# Patient Record
Sex: FEMALE | Race: WHITE | Employment: UNEMPLOYED | URBAN - METROPOLITAN AREA
[De-identification: names, ages, dates, MRNs, and addresses within clinical notes are randomized per-mention and may not be internally consistent; named-entity substitution may affect disease eponyms.]

---

## 2018-12-19 ENCOUNTER — ANESTHESIA EVENT (OUTPATIENT)
Dept: OPERATING ROOM | Age: 43
End: 2018-12-19
Payer: COMMERCIAL

## 2018-12-19 ENCOUNTER — HOSPITAL ENCOUNTER (OUTPATIENT)
Age: 43
Setting detail: OUTPATIENT SURGERY
Discharge: HOME | End: 2018-12-19
Attending: ORTHOPAEDIC SURGERY | Admitting: ORTHOPAEDIC SURGERY
Payer: COMMERCIAL

## 2018-12-19 ENCOUNTER — ANESTHESIA (OUTPATIENT)
Dept: OPERATING ROOM | Age: 43
End: 2018-12-19
Payer: COMMERCIAL

## 2018-12-19 ENCOUNTER — HOSPITAL ENCOUNTER (OUTPATIENT)
Dept: GENERAL RADIOLOGY | Age: 43
Setting detail: OUTPATIENT SURGERY
Discharge: HOME | End: 2018-12-21
Attending: ORTHOPAEDIC SURGERY
Payer: COMMERCIAL

## 2018-12-19 VITALS
SYSTOLIC BLOOD PRESSURE: 139 MMHG | HEART RATE: 80 BPM | DIASTOLIC BLOOD PRESSURE: 90 MMHG | BODY MASS INDEX: 38.09 KG/M2 | HEIGHT: 66 IN | OXYGEN SATURATION: 98 % | RESPIRATION RATE: 13 BRPM | TEMPERATURE: 97.7 F | WEIGHT: 237 LBS

## 2018-12-19 VITALS — TEMPERATURE: 97.5 F | SYSTOLIC BLOOD PRESSURE: 111 MMHG | OXYGEN SATURATION: 100 % | DIASTOLIC BLOOD PRESSURE: 61 MMHG

## 2018-12-19 DIAGNOSIS — R52 PAIN: ICD-10-CM

## 2018-12-19 LAB
HCT VFR BLD CALC: 38 % (ref 37–47)
HEMOGLOBIN: 13.4 G/DL (ref 12–16)
MCH RBC QN AUTO: 30.3 PG (ref 27–31.3)
MCHC RBC AUTO-ENTMCNC: 35.1 % (ref 33–37)
MCV RBC AUTO: 86.3 FL (ref 82–100)
PDW BLD-RTO: 13.4 % (ref 11.5–14.5)
PLATELET # BLD: 193 K/UL (ref 130–400)
RBC # BLD: 4.4 M/UL (ref 4.2–5.4)
WBC # BLD: 5.5 K/UL (ref 4.8–10.8)

## 2018-12-19 PROCEDURE — 7100000001 HC PACU RECOVERY - ADDTL 15 MIN: Performed by: ORTHOPAEDIC SURGERY

## 2018-12-19 PROCEDURE — 2500000003 HC RX 250 WO HCPCS: Performed by: ORTHOPAEDIC SURGERY

## 2018-12-19 PROCEDURE — 2709999900 HC NON-CHARGEABLE SUPPLY: Performed by: ORTHOPAEDIC SURGERY

## 2018-12-19 PROCEDURE — 3600000003 HC SURGERY LEVEL 3 BASE: Performed by: ORTHOPAEDIC SURGERY

## 2018-12-19 PROCEDURE — 6360000002 HC RX W HCPCS: Performed by: NURSE ANESTHETIST, CERTIFIED REGISTERED

## 2018-12-19 PROCEDURE — C1713 ANCHOR/SCREW BN/BN,TIS/BN: HCPCS | Performed by: ORTHOPAEDIC SURGERY

## 2018-12-19 PROCEDURE — 3209999900 FLUORO FOR SURGICAL PROCEDURES

## 2018-12-19 PROCEDURE — 6360000002 HC RX W HCPCS: Performed by: ORTHOPAEDIC SURGERY

## 2018-12-19 PROCEDURE — 2500000003 HC RX 250 WO HCPCS: Performed by: NURSE ANESTHETIST, CERTIFIED REGISTERED

## 2018-12-19 PROCEDURE — 2580000003 HC RX 258: Performed by: ORTHOPAEDIC SURGERY

## 2018-12-19 PROCEDURE — 7100000011 HC PHASE II RECOVERY - ADDTL 15 MIN: Performed by: ORTHOPAEDIC SURGERY

## 2018-12-19 PROCEDURE — 3700000000 HC ANESTHESIA ATTENDED CARE: Performed by: ORTHOPAEDIC SURGERY

## 2018-12-19 PROCEDURE — 7100000010 HC PHASE II RECOVERY - FIRST 15 MIN: Performed by: ORTHOPAEDIC SURGERY

## 2018-12-19 PROCEDURE — 2580000003 HC RX 258: Performed by: ANESTHESIOLOGY

## 2018-12-19 PROCEDURE — 3700000001 HC ADD 15 MINUTES (ANESTHESIA): Performed by: ORTHOPAEDIC SURGERY

## 2018-12-19 PROCEDURE — 7100000000 HC PACU RECOVERY - FIRST 15 MIN: Performed by: ORTHOPAEDIC SURGERY

## 2018-12-19 PROCEDURE — 6370000000 HC RX 637 (ALT 250 FOR IP): Performed by: ANESTHESIOLOGY

## 2018-12-19 PROCEDURE — 85027 COMPLETE CBC AUTOMATED: CPT

## 2018-12-19 PROCEDURE — 3600000013 HC SURGERY LEVEL 3 ADDTL 15MIN: Performed by: ORTHOPAEDIC SURGERY

## 2018-12-19 RX ORDER — LIDOCAINE HYDROCHLORIDE 10 MG/ML
1 INJECTION, SOLUTION EPIDURAL; INFILTRATION; INTRACAUDAL; PERINEURAL
Status: DISCONTINUED | OUTPATIENT
Start: 2018-12-19 | End: 2018-12-19 | Stop reason: HOSPADM

## 2018-12-19 RX ORDER — FENTANYL CITRATE 50 UG/ML
50 INJECTION, SOLUTION INTRAMUSCULAR; INTRAVENOUS EVERY 10 MIN PRN
Status: DISCONTINUED | OUTPATIENT
Start: 2018-12-19 | End: 2018-12-19 | Stop reason: HOSPADM

## 2018-12-19 RX ORDER — FENTANYL CITRATE 50 UG/ML
INJECTION, SOLUTION INTRAMUSCULAR; INTRAVENOUS PRN
Status: DISCONTINUED | OUTPATIENT
Start: 2018-12-19 | End: 2018-12-19 | Stop reason: SDUPTHER

## 2018-12-19 RX ORDER — SODIUM CHLORIDE, SODIUM LACTATE, POTASSIUM CHLORIDE, CALCIUM CHLORIDE 600; 310; 30; 20 MG/100ML; MG/100ML; MG/100ML; MG/100ML
INJECTION, SOLUTION INTRAVENOUS CONTINUOUS
Status: DISCONTINUED | OUTPATIENT
Start: 2018-12-19 | End: 2018-12-19 | Stop reason: HOSPADM

## 2018-12-19 RX ORDER — HYDROCODONE BITARTRATE AND ACETAMINOPHEN 5; 325 MG/1; MG/1
1 TABLET ORAL PRN
Status: COMPLETED | OUTPATIENT
Start: 2018-12-19 | End: 2018-12-19

## 2018-12-19 RX ORDER — HYDROCODONE BITARTRATE AND ACETAMINOPHEN 5; 325 MG/1; MG/1
2 TABLET ORAL PRN
Status: COMPLETED | OUTPATIENT
Start: 2018-12-19 | End: 2018-12-19

## 2018-12-19 RX ORDER — ONDANSETRON 2 MG/ML
4 INJECTION INTRAMUSCULAR; INTRAVENOUS
Status: DISCONTINUED | OUTPATIENT
Start: 2018-12-19 | End: 2018-12-19 | Stop reason: HOSPADM

## 2018-12-19 RX ORDER — CEFAZOLIN SODIUM 2 G/50ML
2 SOLUTION INTRAVENOUS
Status: COMPLETED | OUTPATIENT
Start: 2018-12-19 | End: 2018-12-19

## 2018-12-19 RX ORDER — SODIUM CHLORIDE 0.9 % (FLUSH) 0.9 %
10 SYRINGE (ML) INJECTION PRN
Status: DISCONTINUED | OUTPATIENT
Start: 2018-12-19 | End: 2018-12-19 | Stop reason: HOSPADM

## 2018-12-19 RX ORDER — SODIUM CHLORIDE 0.9 % (FLUSH) 0.9 %
10 SYRINGE (ML) INJECTION EVERY 12 HOURS SCHEDULED
Status: DISCONTINUED | OUTPATIENT
Start: 2018-12-19 | End: 2018-12-19 | Stop reason: HOSPADM

## 2018-12-19 RX ORDER — PROPOFOL 10 MG/ML
INJECTION, EMULSION INTRAVENOUS PRN
Status: DISCONTINUED | OUTPATIENT
Start: 2018-12-19 | End: 2018-12-19 | Stop reason: SDUPTHER

## 2018-12-19 RX ORDER — DEXAMETHASONE SODIUM PHOSPHATE 4 MG/ML
INJECTION, SOLUTION INTRA-ARTICULAR; INTRALESIONAL; INTRAMUSCULAR; INTRAVENOUS; SOFT TISSUE PRN
Status: DISCONTINUED | OUTPATIENT
Start: 2018-12-19 | End: 2018-12-19 | Stop reason: SDUPTHER

## 2018-12-19 RX ORDER — MAGNESIUM HYDROXIDE 1200 MG/15ML
LIQUID ORAL CONTINUOUS PRN
Status: DISCONTINUED | OUTPATIENT
Start: 2018-12-19 | End: 2018-12-19 | Stop reason: HOSPADM

## 2018-12-19 RX ORDER — BUPIVACAINE HYDROCHLORIDE 5 MG/ML
INJECTION, SOLUTION EPIDURAL; INTRACAUDAL PRN
Status: DISCONTINUED | OUTPATIENT
Start: 2018-12-19 | End: 2018-12-19 | Stop reason: HOSPADM

## 2018-12-19 RX ORDER — MEPERIDINE HYDROCHLORIDE 25 MG/ML
12.5 INJECTION INTRAMUSCULAR; INTRAVENOUS; SUBCUTANEOUS EVERY 5 MIN PRN
Status: DISCONTINUED | OUTPATIENT
Start: 2018-12-19 | End: 2018-12-19 | Stop reason: HOSPADM

## 2018-12-19 RX ORDER — MIDAZOLAM HYDROCHLORIDE 1 MG/ML
INJECTION INTRAMUSCULAR; INTRAVENOUS PRN
Status: DISCONTINUED | OUTPATIENT
Start: 2018-12-19 | End: 2018-12-19 | Stop reason: SDUPTHER

## 2018-12-19 RX ORDER — LABETALOL HYDROCHLORIDE 5 MG/ML
INJECTION, SOLUTION INTRAVENOUS PRN
Status: DISCONTINUED | OUTPATIENT
Start: 2018-12-19 | End: 2018-12-19 | Stop reason: SDUPTHER

## 2018-12-19 RX ORDER — ONDANSETRON 2 MG/ML
INJECTION INTRAMUSCULAR; INTRAVENOUS PRN
Status: DISCONTINUED | OUTPATIENT
Start: 2018-12-19 | End: 2018-12-19 | Stop reason: SDUPTHER

## 2018-12-19 RX ORDER — LIDOCAINE HYDROCHLORIDE 20 MG/ML
INJECTION, SOLUTION INFILTRATION; PERINEURAL PRN
Status: DISCONTINUED | OUTPATIENT
Start: 2018-12-19 | End: 2018-12-19 | Stop reason: SDUPTHER

## 2018-12-19 RX ORDER — LIDOCAINE HYDROCHLORIDE 10 MG/ML
5 INJECTION, SOLUTION INFILTRATION; PERINEURAL ONCE
Status: COMPLETED | OUTPATIENT
Start: 2018-12-19 | End: 2018-12-19

## 2018-12-19 RX ORDER — METOCLOPRAMIDE HYDROCHLORIDE 5 MG/ML
10 INJECTION INTRAMUSCULAR; INTRAVENOUS
Status: DISCONTINUED | OUTPATIENT
Start: 2018-12-19 | End: 2018-12-19 | Stop reason: HOSPADM

## 2018-12-19 RX ORDER — PROPOFOL 10 MG/ML
INJECTION, EMULSION INTRAVENOUS CONTINUOUS PRN
Status: DISCONTINUED | OUTPATIENT
Start: 2018-12-19 | End: 2018-12-19 | Stop reason: SDUPTHER

## 2018-12-19 RX ORDER — DIPHENHYDRAMINE HYDROCHLORIDE 50 MG/ML
12.5 INJECTION INTRAMUSCULAR; INTRAVENOUS
Status: DISCONTINUED | OUTPATIENT
Start: 2018-12-19 | End: 2018-12-19 | Stop reason: HOSPADM

## 2018-12-19 RX ADMIN — FENTANYL CITRATE 25 MCG: 50 INJECTION, SOLUTION INTRAMUSCULAR; INTRAVENOUS at 14:07

## 2018-12-19 RX ADMIN — FENTANYL CITRATE 50 MCG: 50 INJECTION, SOLUTION INTRAMUSCULAR; INTRAVENOUS at 14:05

## 2018-12-19 RX ADMIN — FENTANYL CITRATE 50 MCG: 50 INJECTION, SOLUTION INTRAMUSCULAR; INTRAVENOUS at 14:27

## 2018-12-19 RX ADMIN — DEXAMETHASONE SODIUM PHOSPHATE 4 MG: 4 INJECTION, SOLUTION INTRA-ARTICULAR; INTRALESIONAL; INTRAMUSCULAR; INTRAVENOUS; SOFT TISSUE at 14:09

## 2018-12-19 RX ADMIN — MIDAZOLAM HYDROCHLORIDE 2 MG: 1 INJECTION, SOLUTION INTRAMUSCULAR; INTRAVENOUS at 13:58

## 2018-12-19 RX ADMIN — FENTANYL CITRATE 50 MCG: 50 INJECTION, SOLUTION INTRAMUSCULAR; INTRAVENOUS at 15:26

## 2018-12-19 RX ADMIN — LIDOCAINE HYDROCHLORIDE 5 ML: 20 INJECTION, SOLUTION INFILTRATION; PERINEURAL at 14:05

## 2018-12-19 RX ADMIN — ONDANSETRON 4 MG: 2 INJECTION INTRAMUSCULAR; INTRAVENOUS at 14:45

## 2018-12-19 RX ADMIN — HYDROCODONE BITARTRATE AND ACETAMINOPHEN 2 TABLET: 5; 325 TABLET ORAL at 17:10

## 2018-12-19 RX ADMIN — FENTANYL CITRATE 50 MCG: 50 INJECTION, SOLUTION INTRAMUSCULAR; INTRAVENOUS at 15:02

## 2018-12-19 RX ADMIN — LIDOCAINE HYDROCHLORIDE 0.1 ML: 10 INJECTION, SOLUTION EPIDURAL; INFILTRATION; INTRACAUDAL; PERINEURAL at 10:37

## 2018-12-19 RX ADMIN — CEFAZOLIN SODIUM 2 G: 2 SOLUTION INTRAVENOUS at 13:58

## 2018-12-19 RX ADMIN — SODIUM CHLORIDE, POTASSIUM CHLORIDE, SODIUM LACTATE AND CALCIUM CHLORIDE: 600; 310; 30; 20 INJECTION, SOLUTION INTRAVENOUS at 13:58

## 2018-12-19 RX ADMIN — FENTANYL CITRATE 25 MCG: 50 INJECTION, SOLUTION INTRAMUSCULAR; INTRAVENOUS at 14:48

## 2018-12-19 RX ADMIN — PROPOFOL 200 MG: 10 INJECTION, EMULSION INTRAVENOUS at 14:05

## 2018-12-19 RX ADMIN — SODIUM CHLORIDE, POTASSIUM CHLORIDE, SODIUM LACTATE AND CALCIUM CHLORIDE 1000 ML: 600; 310; 30; 20 INJECTION, SOLUTION INTRAVENOUS at 10:38

## 2018-12-19 RX ADMIN — PROPOFOL 50 MCG/KG/MIN: 10 INJECTION, EMULSION INTRAVENOUS at 14:49

## 2018-12-19 RX ADMIN — FENTANYL CITRATE 50 MCG: 50 INJECTION, SOLUTION INTRAMUSCULAR; INTRAVENOUS at 14:32

## 2018-12-19 RX ADMIN — LABETALOL HYDROCHLORIDE 10 MG: 5 INJECTION, SOLUTION INTRAVENOUS at 15:08

## 2018-12-19 ASSESSMENT — PULMONARY FUNCTION TESTS
PIF_VALUE: 1
PIF_VALUE: 4
PIF_VALUE: 3
PIF_VALUE: 3
PIF_VALUE: 4
PIF_VALUE: 5
PIF_VALUE: 3
PIF_VALUE: 4
PIF_VALUE: 3
PIF_VALUE: 2
PIF_VALUE: 4
PIF_VALUE: 3
PIF_VALUE: 4
PIF_VALUE: 4
PIF_VALUE: 3
PIF_VALUE: 4
PIF_VALUE: 3
PIF_VALUE: 3
PIF_VALUE: 4
PIF_VALUE: 0
PIF_VALUE: 3
PIF_VALUE: 4
PIF_VALUE: 3
PIF_VALUE: 4
PIF_VALUE: 3
PIF_VALUE: 4
PIF_VALUE: 4
PIF_VALUE: 3
PIF_VALUE: 4
PIF_VALUE: 3
PIF_VALUE: 3
PIF_VALUE: 0
PIF_VALUE: 2
PIF_VALUE: 3
PIF_VALUE: 4
PIF_VALUE: 3
PIF_VALUE: 3
PIF_VALUE: 2
PIF_VALUE: 3
PIF_VALUE: 4
PIF_VALUE: 3
PIF_VALUE: 4
PIF_VALUE: 3
PIF_VALUE: 4
PIF_VALUE: 4
PIF_VALUE: 3
PIF_VALUE: 4
PIF_VALUE: 4
PIF_VALUE: 5
PIF_VALUE: 2
PIF_VALUE: 3
PIF_VALUE: 3
PIF_VALUE: 4
PIF_VALUE: 3
PIF_VALUE: 0
PIF_VALUE: 4
PIF_VALUE: 1
PIF_VALUE: 4
PIF_VALUE: 3
PIF_VALUE: 3
PIF_VALUE: 4
PIF_VALUE: 3
PIF_VALUE: 5
PIF_VALUE: 3
PIF_VALUE: 4
PIF_VALUE: 3
PIF_VALUE: 0
PIF_VALUE: 3
PIF_VALUE: 0
PIF_VALUE: 2
PIF_VALUE: 4
PIF_VALUE: 4
PIF_VALUE: 3
PIF_VALUE: 3
PIF_VALUE: 4
PIF_VALUE: 3
PIF_VALUE: 3
PIF_VALUE: 4
PIF_VALUE: 2
PIF_VALUE: 3
PIF_VALUE: 4
PIF_VALUE: 4
PIF_VALUE: 3
PIF_VALUE: 4
PIF_VALUE: 0
PIF_VALUE: 1
PIF_VALUE: 4
PIF_VALUE: 3
PIF_VALUE: 3
PIF_VALUE: 20
PIF_VALUE: 4
PIF_VALUE: 4
PIF_VALUE: 3
PIF_VALUE: 0
PIF_VALUE: 3
PIF_VALUE: 3
PIF_VALUE: 4
PIF_VALUE: 3
PIF_VALUE: 4
PIF_VALUE: 3
PIF_VALUE: 1
PIF_VALUE: 1
PIF_VALUE: 3
PIF_VALUE: 4
PIF_VALUE: 3
PIF_VALUE: 5
PIF_VALUE: 3
PIF_VALUE: 3
PIF_VALUE: 0
PIF_VALUE: 0
PIF_VALUE: 3
PIF_VALUE: 4

## 2018-12-19 NOTE — OP NOTE
After prepping and draping an incision was marked out over the palmar aspect of the right ring finger extending to the mid lateral line about the radial aspect of the digit with oblique limbs palmarly both proximally and distally. A tourniquet was raised. The 15 blade was used to incise skin. Dissection was carried down to the level of the neurovascular bundle a full-thickness subcutaneous flap was then elevated directly off of the flexor retinacular sheath. The radial neurovascular bundle was protected as the constraining soft tissues adjacent to the neurovascular bundle and the flexor retinacular sheath were released. We then elevated the flexor retinacular sheath between the A2 and the A4 pulley. The pulley was reflected with the subcutaneous flap. The flexor tendons were mobilized. The volar plate was released off of the P2 base. The radial and ulnar collaterals were released off of the P1 distally. The digit was then placed into a shotgun position to allow full exposure of the P2 base. The P2 base was already healing in a suboptimal position. There was destruction of approximately 70% of the total joint surface area. The rongeur and small sagittal saw was used to remove the bone and to contour it as to receive a bola-hamate allograft. Having prepared the P2 base to receive the graft we created a transverse incision over the fourth and fifth CMC articulation. Dissection was carried down to the extensor tendons. These were mobilized and retracted. Care was taken to avoid injury to dorsal sensory branch of ulnar nerve. Subperiosteal dissection was undertaken about the hamate and fourth and fifth CMC articulation. The midline of the graft intended to be harvested was marked out. Having previously measured our recipient site the donor site was marked out with the 15 blade.   Standard techniques were then used to harvest the bola-hamate allograft using the sagittal saws for the dorsal cuts and the

## 2023-06-07 ENCOUNTER — OFFICE VISIT (OUTPATIENT)
Dept: FAMILY MEDICINE CLINIC | Age: 48
End: 2023-06-07
Payer: COMMERCIAL

## 2023-06-07 VITALS
HEIGHT: 66 IN | WEIGHT: 237 LBS | TEMPERATURE: 98.2 F | SYSTOLIC BLOOD PRESSURE: 132 MMHG | HEART RATE: 78 BPM | DIASTOLIC BLOOD PRESSURE: 84 MMHG | BODY MASS INDEX: 38.09 KG/M2 | OXYGEN SATURATION: 97 % | RESPIRATION RATE: 14 BRPM

## 2023-06-07 DIAGNOSIS — T36.95XA ANTIBIOTIC-INDUCED YEAST INFECTION: ICD-10-CM

## 2023-06-07 DIAGNOSIS — H65.192 ACUTE NON-SUPPURATIVE OTITIS MEDIA, LEFT: Primary | ICD-10-CM

## 2023-06-07 DIAGNOSIS — B37.9 ANTIBIOTIC-INDUCED YEAST INFECTION: ICD-10-CM

## 2023-06-07 PROCEDURE — 1036F TOBACCO NON-USER: CPT | Performed by: NURSE PRACTITIONER

## 2023-06-07 PROCEDURE — 99213 OFFICE O/P EST LOW 20 MIN: CPT | Performed by: NURSE PRACTITIONER

## 2023-06-07 PROCEDURE — G8417 CALC BMI ABV UP PARAM F/U: HCPCS | Performed by: NURSE PRACTITIONER

## 2023-06-07 PROCEDURE — G8427 DOCREV CUR MEDS BY ELIG CLIN: HCPCS | Performed by: NURSE PRACTITIONER

## 2023-06-07 RX ORDER — FLUCONAZOLE 150 MG/1
TABLET ORAL
Qty: 2 TABLET | Refills: 0 | Status: SHIPPED | OUTPATIENT
Start: 2023-06-07 | End: 2023-06-07

## 2023-06-07 RX ORDER — AMOXICILLIN AND CLAVULANATE POTASSIUM 875; 125 MG/1; MG/1
1 TABLET, FILM COATED ORAL 2 TIMES DAILY
Qty: 20 TABLET | Refills: 0 | Status: SHIPPED | OUTPATIENT
Start: 2023-06-07 | End: 2023-06-17

## 2023-06-07 RX ORDER — AMOXICILLIN AND CLAVULANATE POTASSIUM 875; 125 MG/1; MG/1
1 TABLET, FILM COATED ORAL 2 TIMES DAILY
Qty: 20 TABLET | Refills: 0 | Status: SHIPPED | OUTPATIENT
Start: 2023-06-07 | End: 2023-06-07

## 2023-06-07 RX ORDER — FLUCONAZOLE 150 MG/1
TABLET ORAL
Qty: 2 TABLET | Refills: 0 | Status: SHIPPED | OUTPATIENT
Start: 2023-06-07

## 2023-06-07 SDOH — ECONOMIC STABILITY: FOOD INSECURITY: WITHIN THE PAST 12 MONTHS, YOU WORRIED THAT YOUR FOOD WOULD RUN OUT BEFORE YOU GOT MONEY TO BUY MORE.: NEVER TRUE

## 2023-06-07 SDOH — ECONOMIC STABILITY: HOUSING INSECURITY
IN THE LAST 12 MONTHS, WAS THERE A TIME WHEN YOU DID NOT HAVE A STEADY PLACE TO SLEEP OR SLEPT IN A SHELTER (INCLUDING NOW)?: NO

## 2023-06-07 SDOH — ECONOMIC STABILITY: INCOME INSECURITY: HOW HARD IS IT FOR YOU TO PAY FOR THE VERY BASICS LIKE FOOD, HOUSING, MEDICAL CARE, AND HEATING?: NOT HARD AT ALL

## 2023-06-07 SDOH — ECONOMIC STABILITY: FOOD INSECURITY: WITHIN THE PAST 12 MONTHS, THE FOOD YOU BOUGHT JUST DIDN'T LAST AND YOU DIDN'T HAVE MONEY TO GET MORE.: NEVER TRUE

## 2023-06-07 ASSESSMENT — PATIENT HEALTH QUESTIONNAIRE - PHQ9
SUM OF ALL RESPONSES TO PHQ9 QUESTIONS 1 & 2: 0
1. LITTLE INTEREST OR PLEASURE IN DOING THINGS: 0
2. FEELING DOWN, DEPRESSED OR HOPELESS: 0
SUM OF ALL RESPONSES TO PHQ QUESTIONS 1-9: 0

## 2023-06-07 ASSESSMENT — ENCOUNTER SYMPTOMS
EYE REDNESS: 0
SINUS PAIN: 0
APNEA: 0
SHORTNESS OF BREATH: 0
ABDOMINAL DISTENTION: 0
CHEST TIGHTNESS: 0
RHINORRHEA: 1
SORE THROAT: 0
EYE ITCHING: 0
WHEEZING: 0
COUGH: 0
CONSTIPATION: 0
ABDOMINAL PAIN: 0
VOMITING: 0
DIARRHEA: 0

## 2023-06-07 NOTE — PROGRESS NOTES
probiotic during antibiotic use and for a few days after to help reduce the risk of developing a secondary infection. Separate the yogurt and antibiotic by at least 1 hour. Avoid alcohol while taking antibiotics. Return if symptoms worsen or fail to improve. Reviewed with the patient: current clinical status, medications, activities and diet. Side effects, adverse effects of the medication prescribed today, as well as treatment plan and result expectations have been discussed with the patient who expresses understanding and desires to proceed. Close follow up to evaluate treatment results and for coordination of care. I have reviewed the patient's medical history in detail and updated the computerized patient record.       Brandon Weiss, APRN - CNP

## 2023-07-10 ENCOUNTER — OFFICE VISIT (OUTPATIENT)
Dept: PRIMARY CARE | Facility: CLINIC | Age: 48
End: 2023-07-10
Payer: COMMERCIAL

## 2023-07-10 VITALS
SYSTOLIC BLOOD PRESSURE: 134 MMHG | TEMPERATURE: 97.8 F | DIASTOLIC BLOOD PRESSURE: 80 MMHG | BODY MASS INDEX: 37.61 KG/M2 | HEIGHT: 66 IN | HEART RATE: 80 BPM | RESPIRATION RATE: 16 BRPM | WEIGHT: 234 LBS

## 2023-07-10 DIAGNOSIS — L08.9 SKIN INFECTION: Primary | ICD-10-CM

## 2023-07-10 PROBLEM — R21 RASH: Status: ACTIVE | Noted: 2023-07-10

## 2023-07-10 PROBLEM — I10 ESSENTIAL HYPERTENSION: Status: ACTIVE | Noted: 2023-07-10

## 2023-07-10 PROBLEM — H10.10 ALLERGIC CONJUNCTIVITIS: Status: ACTIVE | Noted: 2023-07-10

## 2023-07-10 PROBLEM — S62.638D: Status: ACTIVE | Noted: 2023-07-10

## 2023-07-10 PROBLEM — I25.2 OLD INFERIOR WALL MYOCARDIAL INFARCTION: Status: ACTIVE | Noted: 2023-07-10

## 2023-07-10 PROBLEM — J30.2 SEASONAL ALLERGIC RHINITIS: Status: ACTIVE | Noted: 2023-07-10

## 2023-07-10 PROCEDURE — 99213 OFFICE O/P EST LOW 20 MIN: CPT | Performed by: FAMILY MEDICINE

## 2023-07-10 PROCEDURE — 3079F DIAST BP 80-89 MM HG: CPT | Performed by: FAMILY MEDICINE

## 2023-07-10 PROCEDURE — 1036F TOBACCO NON-USER: CPT | Performed by: FAMILY MEDICINE

## 2023-07-10 PROCEDURE — 3075F SYST BP GE 130 - 139MM HG: CPT | Performed by: FAMILY MEDICINE

## 2023-07-10 RX ORDER — LOSARTAN POTASSIUM AND HYDROCHLOROTHIAZIDE 12.5; 5 MG/1; MG/1
1 TABLET ORAL DAILY
COMMUNITY
Start: 2021-04-06 | End: 2023-08-15 | Stop reason: ALTCHOICE

## 2023-07-10 RX ORDER — FLUTICASONE PROPIONATE 50 MCG
1 SPRAY, SUSPENSION (ML) NASAL DAILY
COMMUNITY
Start: 2022-09-26 | End: 2024-02-20 | Stop reason: SDUPTHER

## 2023-07-10 RX ORDER — MONTELUKAST SODIUM 10 MG/1
1 TABLET ORAL DAILY
COMMUNITY
Start: 2022-10-28 | End: 2023-10-30

## 2023-07-10 RX ORDER — PREDNISONE 20 MG/1
20 TABLET ORAL 2 TIMES DAILY
COMMUNITY
End: 2024-01-31 | Stop reason: ALTCHOICE

## 2023-07-10 RX ORDER — OLOPATADINE HYDROCHLORIDE 1 MG/ML
SOLUTION/ DROPS OPHTHALMIC 2 TIMES DAILY
COMMUNITY
Start: 2022-10-06

## 2023-07-10 RX ORDER — CEPHALEXIN 500 MG/1
500 CAPSULE ORAL 4 TIMES DAILY
COMMUNITY
End: 2023-08-15 | Stop reason: ALTCHOICE

## 2023-07-10 RX ORDER — LEVOCETIRIZINE DIHYDROCHLORIDE 5 MG/1
1 TABLET, FILM COATED ORAL DAILY
COMMUNITY
Start: 2022-10-06 | End: 2023-10-30

## 2023-07-10 NOTE — PROGRESS NOTES
Ashia Martinez is a 47 y.o. female here today for blisters all over arms, began June 26. Blisters are leaving scars,itch, and burn.  Already was on 3 antibiotics.     HPI     Getting blisters and crusting of multiple lesions of arms.  Went to  14 days ago and told it was probable staph infection - given Bactrim and it did clear but now recurred since she finished Bactrim.  Arms only and one lesion on leg.  Went to ER yesterday and given Keflex and prednisone and given mupirocin cream too.  She did not start this medicine yet because she wanted to get my opinion.  She has no past medical history of recurrent skin problems.  She says there are no lesions anywhere else on her body.  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      Current Outpatient Medications:     cephalexin (Keflex) 500 mg capsule, Take 1 capsule (500 mg) by mouth 4 times a day., Disp: , Rfl:     fluticasone (Flonase) 50 mcg/actuation nasal spray, Administer 1 spray into affected nostril(s) once daily., Disp: , Rfl:     levocetirizine (Xyzal) 5 mg tablet, Take 1 tablet (5 mg) by mouth once daily., Disp: , Rfl:     losartan-hydrochlorothiazide (Hyzaar) 50-12.5 mg tablet, Take 1 tablet by mouth once daily., Disp: , Rfl:     montelukast (Singulair) 10 mg tablet, Take 1 tablet (10 mg) by mouth once daily., Disp: , Rfl:     olopatadine (Patanol) 0.1 % ophthalmic solution, Administer into affected eye(s) twice a day., Disp: , Rfl:     predniSONE (Deltasone) 20 mg tablet, Take 1 tablet (20 mg) by mouth 2 times a day. For 5 days, Disp: , Rfl:     Patient Active Problem List   Diagnosis    Allergic conjunctivitis    Closed displaced fracture of distal phalanx of ring finger with routine healing    Essential hypertension    Rash    Old inferior wall myocardial infarction    Seasonal allergic rhinitis    Skin infection         No results found for this or any previous visit (from the past 2016 hour(s)).  "    Objective    Visit Vitals  /80   Pulse 80   Temp 36.6 °C (97.8 °F)   Resp 16   Ht 1.676 m (5' 6\")   Wt 106 kg (234 lb)   BMI 37.77 kg/m²     Body mass index is 37.77 kg/m².     Physical Exam   Arms-over the patient's arms are about 8 different small slightly crusted lesions.  Many of them are denuded now and are from 3 to 5 mm in size.  There is no significant induration or tenderness.  No current blisters or pustules.  There are no lesions seen on her trunk.    Assessment    1. Skin infection     I think these lesions likely represent some type of superficial skin infection which has recurred.  There are no pustules or other liquid that can be cultured.  I agree with the cephalexin and mupirocin that was prescribed from the emergency room.  I think it is reasonable also to take the prednisone that was prescribed.  I recommend that she practice very good hand hygiene.  She should keep these lesions covered when she is around other people but she can leave them open to air at night.  She should completely avoid scratching or picking at them.  I think this will heal up and clear as she takes the antibiotic.  I recommend to call us and make a follow up appointment if sxs worsen or do not resolve.           "

## 2023-07-12 ENCOUNTER — TELEPHONE (OUTPATIENT)
Dept: PRIMARY CARE | Facility: CLINIC | Age: 48
End: 2023-07-12
Payer: COMMERCIAL

## 2023-07-12 NOTE — TELEPHONE ENCOUNTER
Pt is currently on Cephalexin 500 mg 4 times a day but she states she has nausea and dizziness, if there are any alternatives?   
Pt was notified.   
English

## 2023-08-15 ENCOUNTER — OFFICE VISIT (OUTPATIENT)
Dept: PRIMARY CARE | Facility: CLINIC | Age: 48
End: 2023-08-15
Payer: COMMERCIAL

## 2023-08-15 VITALS
TEMPERATURE: 97.1 F | HEIGHT: 66 IN | WEIGHT: 233 LBS | BODY MASS INDEX: 37.45 KG/M2 | HEART RATE: 74 BPM | SYSTOLIC BLOOD PRESSURE: 112 MMHG | RESPIRATION RATE: 20 BRPM | DIASTOLIC BLOOD PRESSURE: 76 MMHG

## 2023-08-15 DIAGNOSIS — E66.01 CLASS 2 SEVERE OBESITY DUE TO EXCESS CALORIES WITH SERIOUS COMORBIDITY AND BODY MASS INDEX (BMI) OF 37.0 TO 37.9 IN ADULT (MULTI): ICD-10-CM

## 2023-08-15 DIAGNOSIS — S01.511D LIP LACERATION, SUBSEQUENT ENCOUNTER: ICD-10-CM

## 2023-08-15 DIAGNOSIS — I10 ESSENTIAL HYPERTENSION: ICD-10-CM

## 2023-08-15 DIAGNOSIS — R55 VASOVAGAL SYNCOPE: ICD-10-CM

## 2023-08-15 DIAGNOSIS — E87.6 HYPOKALEMIA: Primary | ICD-10-CM

## 2023-08-15 PROBLEM — E66.812 CLASS 2 SEVERE OBESITY DUE TO EXCESS CALORIES WITH SERIOUS COMORBIDITY AND BODY MASS INDEX (BMI) OF 37.0 TO 37.9 IN ADULT: Status: ACTIVE | Noted: 2023-08-10

## 2023-08-15 PROBLEM — S01.511A LIP LACERATION: Status: ACTIVE | Noted: 2023-08-15

## 2023-08-15 PROCEDURE — 3008F BODY MASS INDEX DOCD: CPT | Performed by: FAMILY MEDICINE

## 2023-08-15 PROCEDURE — 3078F DIAST BP <80 MM HG: CPT | Performed by: FAMILY MEDICINE

## 2023-08-15 PROCEDURE — 1036F TOBACCO NON-USER: CPT | Performed by: FAMILY MEDICINE

## 2023-08-15 PROCEDURE — 99214 OFFICE O/P EST MOD 30 MIN: CPT | Performed by: FAMILY MEDICINE

## 2023-08-15 PROCEDURE — 3074F SYST BP LT 130 MM HG: CPT | Performed by: FAMILY MEDICINE

## 2023-08-15 RX ORDER — LOSARTAN POTASSIUM 50 MG/1
50 TABLET ORAL DAILY
Qty: 90 TABLET | Refills: 1 | Status: SHIPPED | OUTPATIENT
Start: 2023-08-15 | End: 2024-02-09

## 2023-08-15 RX ORDER — AMOXICILLIN 500 MG/1
CAPSULE ORAL
COMMUNITY
End: 2024-02-20 | Stop reason: WASHOUT

## 2023-08-15 NOTE — PROGRESS NOTES
Ashia Martinez is a 47 y.o. female here today for   Chief Complaint   Patient presents with    Hospital Follow-up   /[facility] from 8/10 for Syncope      HPI     Passed out after skin biopsy - felt dizzy and passed out walking out of exam room.  Hit lip and caused laceration.  2 sutures were placed to the left lower bottom lip 5 days ago and she was told to have the sutures removed 5 to 7 days after placed.  She says she is still having a little bit of bleeding from the laceration at times especially if she talks or smiles.  Otherwise it appears to be healing well.  She also chipped her tooth and has an appointment to see her dentist this weekend.    On review she does have a history of syncope in the past especially after blood drawls or seeing blood.  She has no history of heart arrhythmia or seizure disorder or orthostatic symptoms.    K was 2.9 in ER.  She was given oral potassium but no prescription for potassium at home.  On review I noticed that she has had no labs for 3 years - I have ordered multiple times.  She says that she hates blood draws and she passed out after her last blood draw 3 years ago so she has not gotten the labs when I have ordered them.  She does take losartan/HCTZ for high blood pressure.    She says her left ear hearing has been muffled for a few weeks and her ear has been slightly painful.  This was present prior to her fall.  She is currently on amoxicillin and prednisone from an urgent care for this.      Current Outpatient Medications:     amoxicillin (Amoxil) 500 mg capsule, Take by mouth., Disp: , Rfl:     fluticasone (Flonase) 50 mcg/actuation nasal spray, Administer 1 spray into affected nostril(s) once daily., Disp: , Rfl:     levocetirizine (Xyzal) 5 mg tablet, Take 1 tablet (5 mg) by mouth once daily., Disp: , Rfl:     montelukast (Singulair) 10 mg tablet, Take 1 tablet (10 mg) by mouth once daily., Disp: , Rfl:     olopatadine (Patanol) 0.1 % ophthalmic solution, Administer  into affected eye(s) twice a day., Disp: , Rfl:     predniSONE (Deltasone) 20 mg tablet, Take 1 tablet (20 mg) by mouth 2 times a day. For 5 days, Disp: , Rfl:     losartan (Cozaar) 50 mg tablet, Take 1 tablet (50 mg) by mouth once daily., Disp: 90 tablet, Rfl: 1    Patient Active Problem List   Diagnosis    Allergic conjunctivitis    Closed displaced fracture of distal phalanx of ring finger with routine healing    Essential hypertension    Rash    Old inferior wall myocardial infarction    Seasonal allergic rhinitis    Skin infection    Hypokalemia    Lip laceration    Severe obesity (CMS/HCC)    Syncope         Recent Results (from the past 672 hour(s))   CBC and Auto Differential    Collection Time: 08/10/23  3:39 PM   Result Value Ref Range    WBC 9.5 4.4 - 11.3 x10E9/L    RBC 3.92 (L) 4.00 - 5.20 x10E12/L    Hemoglobin 12.2 12.0 - 16.0 g/dL    Hematocrit 34.1 (L) 36.0 - 46.0 %    MCV 87 80 - 100 fL    MCHC 35.8 32.0 - 36.0 g/dL    Platelets 208 150 - 450 x10E9/L    RDW 14.7 (H) 11.5 - 14.5 %    Neutrophils % 84.9 40.0 - 80.0 %    Immature Granulocytes %, Automated 0.7 0.0 - 0.9 %    Lymphocytes % 8.4 13.0 - 44.0 %    Monocytes % 4.4 2.0 - 10.0 %    Eosinophils % 1.3 0.0 - 6.0 %    Basophils % 0.3 0.0 - 2.0 %    Neutrophils Absolute 8.10 (H) 1.20 - 7.70 x10E9/L    Lymphocytes Absolute 0.80 (L) 1.20 - 4.80 x10E9/L    Monocytes Absolute 0.42 0.10 - 1.00 x10E9/L    Eosinophils Absolute 0.12 0.00 - 0.70 x10E9/L    Basophils Absolute 0.03 0.00 - 0.10 x10E9/L   Comprehensive Metabolic Panel    Collection Time: 08/10/23  3:39 PM   Result Value Ref Range    Glucose 163 (H) 74 - 99 mg/dL    Sodium 140 136 - 145 mmol/L    Potassium 2.9 (LL) 3.5 - 5.3 mmol/L    Chloride 104 98 - 107 mmol/L    Bicarbonate 25 21 - 32 mmol/L    Anion Gap 14 10 - 20 mmol/L    Urea Nitrogen 13 6 - 23 mg/dL    Creatinine 0.78 0.50 - 1.05 mg/dL    GFR Female >90 >90 mL/min/1.73m2    Calcium 8.6 8.6 - 10.3 mg/dL    Albumin 4.1 3.4 - 5.0 g/dL     "Alkaline Phosphatase 55 33 - 110 U/L    Total Protein 7.0 6.4 - 8.2 g/dL    AST 15 9 - 39 U/L    Total Bilirubin 0.7 0.0 - 1.2 mg/dL    ALT (SGPT) 19 7 - 45 U/L   Troponin I, High Sensitivity    Collection Time: 08/10/23  3:39 PM   Result Value Ref Range    Troponin I 3 0 - 13 ng/L   Troponin I, High Sensitivity    Collection Time: 08/10/23  5:04 PM   Result Value Ref Range    Troponin I 3 0 - 13 ng/L        Objective    Visit Vitals  /76   Pulse 74   Temp 36.2 °C (97.1 °F)   Resp 20   Ht 1.676 m (5' 6\")   Wt 106 kg (233 lb)   BMI 37.61 kg/m²     Body mass index is 37.61 kg/m².     Physical Exam   General - Not in acute distress and cooperative.  Build & Nutrition - Well developed  Posture - Normal  Gait - Normal  Mental Status - alert and oriented x 3    Head - Normocephalic    Neck - Thyroid normal size    Eyes - Bilateral - Sclera clear and lids pink without edema or mass.      Skin - Warm and dry with no rashes on visible skin    Lungs - Clear to auscultation and normal breathing effort    Cardiovascular - RRR and no murmurs, rubs or thrill.    Peripheral Vascular - Bilateral - no edema present    Neuropsychiatric - normal mood and affect    Lipid-left lower lip has 2 sutures in place.  There is no signs of infection.    Assessment    1. Hypokalemia  Basic Metabolic Panel   The patient's recent potassium was very low at 2.9 in the emergency room.  This may be secondary to the hydrochlorothiazide and her blood pressure medication and noncompliance with lab orders for the last 3 years.  I told her that for life she must get a lab at least every 6 months to make sure her potassium remains in the normal range.  I am going to discontinue the hydrochlorothiazide and instead changed to plain losartan 50 mg daily.  Depending on the potassium results we may need to start a potassium supplement.     2. Essential hypertension  losartan (Cozaar) 50 mg tablet   As above I am changing to losartan only and " discontinuing hydrochlorothiazide.  Her blood pressure control has been very good.     3. Lip laceration, subsequent encounter     It is too early to take out the 2 sutures in her left bottom lip.  She is still having occasional bleeding as above and I am afraid that the wound will open if we remove them today.  We are going to set her up in the resident clinic in 2 days to remove the 2 sutures since I will not be here in 2 days.     4. Vasovagal syncope     Patient had a classic vasovagal syncope after her punch biopsy as above.  This is not a new occurrence for her and we discussed how to avoid these in the future and what to do if she feels presyncopal.      5.  Obesity --I recommend to eat plenty of plant foods (such as whole-grain products, fruits, and vegetables) and a moderate amount of lean and low-fat, animal-based food (meat and dairy products).  When shopping, choose lean meats, fish, and poultry. I recommend to increase aerobic exercise.

## 2023-08-17 ENCOUNTER — OFFICE VISIT (OUTPATIENT)
Dept: PRIMARY CARE | Facility: CLINIC | Age: 48
End: 2023-08-17
Payer: COMMERCIAL

## 2023-08-17 VITALS
BODY MASS INDEX: 37.63 KG/M2 | OXYGEN SATURATION: 97 % | WEIGHT: 233.13 LBS | HEART RATE: 90 BPM | RESPIRATION RATE: 16 BRPM | SYSTOLIC BLOOD PRESSURE: 130 MMHG | TEMPERATURE: 98.2 F | DIASTOLIC BLOOD PRESSURE: 77 MMHG

## 2023-08-17 DIAGNOSIS — S01.511S LIP LACERATION, SEQUELA: Primary | ICD-10-CM

## 2023-08-17 PROCEDURE — 3078F DIAST BP <80 MM HG: CPT

## 2023-08-17 PROCEDURE — S0630 REMOVAL OF SUTURES: HCPCS

## 2023-08-17 PROCEDURE — 3008F BODY MASS INDEX DOCD: CPT

## 2023-08-17 PROCEDURE — 3075F SYST BP GE 130 - 139MM HG: CPT

## 2023-08-17 PROCEDURE — 1036F TOBACCO NON-USER: CPT

## 2023-08-17 PROCEDURE — 99213 OFFICE O/P EST LOW 20 MIN: CPT

## 2023-08-17 NOTE — PROGRESS NOTES
I reviewed the resident/fellow's documentation and discussed the patient with the resident/fellow. I agree with the resident/fellow's medical decision making as documented in the note.     Racheal Venegas MD

## 2023-08-17 NOTE — PROGRESS NOTES
Subjective   Patient ID: Ashia Martinez is a 47 y.o. female who presents for Suture / Staple Removal (Pt here today for suture removal to lip).    Patient here today for suture removal.  Had an incident where she had passed out at a dermatology appointment following the biopsy.  Patient states that she struck her face when falling from standing height.  Had a scab from abrasion under her left eye additionally had broken left incisor.  Patient had CT completed of face and head is negative for any significant intracranial pathology.  Had 2 Prolene sutures placed in the left following laceration she received from her fall.  Tetanus vaccination up-to-date was given at the ED.  Since that time patient states that scabbing has occurred over the area of the laceration however will fall off while she is doing activities such as brushing her teeth.  Has been covering the area with Carmex.  Denies any additional trauma that she received.  Does not endorse any pus or drainage.  No fever.         Review of Systems    Objective   /77 (BP Location: Right arm, Patient Position: Sitting)   Pulse 90   Temp 36.8 °C (98.2 °F) (Temporal)   Resp 16   Wt 106 kg (233 lb 2 oz)   SpO2 97%   BMI 37.63 kg/m²     Physical Exam  Skin:     Comments: Area laceration over lower lip extending internally to mid lip and EXTR  Across the superficial portion of the lower lip.  Wound is well approximated at this time does have some superficial ulceration and mucus-like scabbing.  Laceration is well approximated, 2 Prolene sutures in place with mild overgrowth of scab surrounding         Assessment/Plan   Problem List Items Addressed This Visit       Lip laceration - Primary     Removed to Prolene sutures from lower lip as laceration is well approximated at this time.  Patient tolerated well, blood loss was scant and minimal secondary to mild debriding of scab around anterior suture.  Recommend patient continue to keep area clean and covered  with Vaseline.  Decrease the amount of Carmex that she is using to allow a more dry scab to form.  No obvious signs of infection present at this time.  Follow-up as needed.

## 2023-09-11 ENCOUNTER — OFFICE VISIT (OUTPATIENT)
Dept: PRIMARY CARE | Facility: CLINIC | Age: 48
End: 2023-09-11
Payer: COMMERCIAL

## 2023-09-11 VITALS
HEART RATE: 80 BPM | WEIGHT: 232 LBS | RESPIRATION RATE: 20 BRPM | BODY MASS INDEX: 37.45 KG/M2 | DIASTOLIC BLOOD PRESSURE: 74 MMHG | OXYGEN SATURATION: 97 % | TEMPERATURE: 97.9 F | SYSTOLIC BLOOD PRESSURE: 128 MMHG

## 2023-09-11 DIAGNOSIS — R09.81 NASAL CONGESTION: Primary | ICD-10-CM

## 2023-09-11 DIAGNOSIS — T36.95XA ANTIBIOTIC-INDUCED YEAST INFECTION: ICD-10-CM

## 2023-09-11 DIAGNOSIS — B37.9 ANTIBIOTIC-INDUCED YEAST INFECTION: ICD-10-CM

## 2023-09-11 DIAGNOSIS — J01.10 ACUTE NON-RECURRENT FRONTAL SINUSITIS: ICD-10-CM

## 2023-09-11 PROCEDURE — 99213 OFFICE O/P EST LOW 20 MIN: CPT | Performed by: NURSE PRACTITIONER

## 2023-09-11 PROCEDURE — 3078F DIAST BP <80 MM HG: CPT | Performed by: NURSE PRACTITIONER

## 2023-09-11 PROCEDURE — 3074F SYST BP LT 130 MM HG: CPT | Performed by: NURSE PRACTITIONER

## 2023-09-11 PROCEDURE — 87635 SARS-COV-2 COVID-19 AMP PRB: CPT

## 2023-09-11 PROCEDURE — 1036F TOBACCO NON-USER: CPT | Performed by: NURSE PRACTITIONER

## 2023-09-11 PROCEDURE — 3008F BODY MASS INDEX DOCD: CPT | Performed by: NURSE PRACTITIONER

## 2023-09-11 RX ORDER — AMOXICILLIN AND CLAVULANATE POTASSIUM 875; 125 MG/1; MG/1
875 TABLET, FILM COATED ORAL 2 TIMES DAILY
Qty: 20 TABLET | Refills: 0 | Status: SHIPPED | OUTPATIENT
Start: 2023-09-11 | End: 2023-09-21

## 2023-09-11 RX ORDER — FLUCONAZOLE 150 MG/1
150 TABLET ORAL ONCE
Qty: 1 TABLET | Refills: 0 | Status: SHIPPED | OUTPATIENT
Start: 2023-09-11 | End: 2023-09-11

## 2023-09-11 ASSESSMENT — ENCOUNTER SYMPTOMS
CONSTIPATION: 0
NAUSEA: 1
ACTIVITY CHANGE: 0
SORE THROAT: 1
HEADACHES: 1
FATIGUE: 1
CHILLS: 0
COUGH: 0
FEVER: 0
APPETITE CHANGE: 1
SINUS PRESSURE: 1
DIARRHEA: 0
VOMITING: 0

## 2023-09-11 NOTE — ASSESSMENT & PLAN NOTE
If Covid testing is negative, antibiotics given for acute sinusitis  Take full course until complete  Can use Tylenol or Motrin as needed for fever or pain  Follow up with PCP if not improving  ER for any SOB, difficulty breathing, uncontrolled fevers or worsening of symptoms

## 2023-09-11 NOTE — ASSESSMENT & PLAN NOTE
Covid swab completed; Will follow up on results as needed  Educated on quarantine protocol  Reviewed supportive care for cold/Covid symptoms  Can use Flonase and Zyrtec daily for symptom support  F/U with PCP if not improving over the next 3-5 days; After 10 days if Covid +  ER for SOB, difficulty breathing, uncontrolled fever

## 2023-09-11 NOTE — ASSESSMENT & PLAN NOTE
Diflucan sent in for possible yeast infection if antibiotics are started  Follow up with PCP if needed

## 2023-09-11 NOTE — PROGRESS NOTES
Subjective   Patient ID: Ashia Martinez is a 47 y.o. female who presents for Sinusitis.    Cold symptoms x3 days    Sneezing  Headache  Sore throat  Body aches  Nasal congestion  Nasal drainage  Allergy meds not working      OTC-singulair/ flonase/ inhaler    Sinusitis  This is a recurrent problem. The current episode started in the past 7 days (3 days). The problem is unchanged. There has been no fever. Associated symptoms include congestion, headaches, sinus pressure, sneezing and a sore throat. Pertinent negatives include no chills or coughing. (Sweats, nausea, body aches, post nasal drainage) Treatments tried: allegry meds. The treatment provided mild relief.        Review of Systems   Constitutional:  Positive for appetite change and fatigue. Negative for activity change, chills and fever.   HENT:  Positive for congestion, sinus pressure, sneezing and sore throat.    Respiratory:  Negative for cough.    Gastrointestinal:  Positive for nausea. Negative for constipation, diarrhea and vomiting.   Neurological:  Positive for headaches.       Objective   /74   Pulse 80   Temp 36.6 °C (97.9 °F) (Temporal)   Resp 20   Wt 105 kg (232 lb)   SpO2 97%   BMI 37.45 kg/m²     Physical Exam  Vitals reviewed.   Constitutional:       Appearance: Normal appearance.   HENT:      Head: Normocephalic.      Right Ear: Tympanic membrane, ear canal and external ear normal.      Left Ear: Tympanic membrane, ear canal and external ear normal.      Nose: Mucosal edema and congestion present.      Right Turbinates: Swollen.      Left Turbinates: Swollen.      Mouth/Throat:      Lips: Pink.      Mouth: Mucous membranes are moist.      Pharynx: Pharyngeal swelling present.   Eyes:      Extraocular Movements: Extraocular movements intact.      Pupils: Pupils are equal, round, and reactive to light.   Cardiovascular:      Rate and Rhythm: Normal rate and regular rhythm.      Pulses: Normal pulses.      Heart sounds: Normal heart  sounds.   Pulmonary:      Effort: Pulmonary effort is normal.      Breath sounds: Normal breath sounds.   Musculoskeletal:      Cervical back: Normal range of motion and neck supple.   Skin:     General: Skin is warm.      Capillary Refill: Capillary refill takes less than 2 seconds.   Neurological:      General: No focal deficit present.      Mental Status: She is alert and oriented to person, place, and time.   Psychiatric:         Mood and Affect: Mood normal.         Behavior: Behavior normal.         Assessment/Plan   Problem List Items Addressed This Visit       Nasal congestion - Primary     Covid swab completed; Will follow up on results as needed  Educated on quarantine protocol  Reviewed supportive care for cold/Covid symptoms  Can use Flonase and Zyrtec daily for symptom support  F/U with PCP if not improving over the next 3-5 days; After 10 days if Covid +  ER for SOB, difficulty breathing, uncontrolled fever         Relevant Orders    Sars-CoV-2 PCR, Symptomatic    Acute non-recurrent frontal sinusitis     If Covid testing is negative, antibiotics given for acute sinusitis  Take full course until complete  Can use Tylenol or Motrin as needed for fever or pain  Follow up with PCP if not improving  ER for any SOB, difficulty breathing, uncontrolled fevers or worsening of symptoms           Relevant Medications    amoxicillin-pot clavulanate (Augmentin) 875-125 mg tablet    Antibiotic-induced yeast infection     Diflucan sent in for possible yeast infection if antibiotics are started  Follow up with PCP if needed           Relevant Medications    fluconazole (Diflucan) 150 mg tablet

## 2023-09-12 LAB — SARS-COV-2 RESULT: DETECTED

## 2023-10-23 ENCOUNTER — OFFICE VISIT (OUTPATIENT)
Dept: PRIMARY CARE | Facility: CLINIC | Age: 48
End: 2023-10-23
Payer: COMMERCIAL

## 2023-10-23 VITALS
SYSTOLIC BLOOD PRESSURE: 132 MMHG | HEART RATE: 90 BPM | HEIGHT: 66 IN | TEMPERATURE: 97.6 F | DIASTOLIC BLOOD PRESSURE: 82 MMHG | WEIGHT: 234 LBS | BODY MASS INDEX: 37.61 KG/M2 | RESPIRATION RATE: 18 BRPM

## 2023-10-23 DIAGNOSIS — T07.XXXA INFECTED INSECT BITES OF MULTIPLE SITES: Primary | ICD-10-CM

## 2023-10-23 DIAGNOSIS — L08.9 INFECTED INSECT BITES OF MULTIPLE SITES: Primary | ICD-10-CM
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
## 2023-10-23 PROCEDURE — 99214 OFFICE O/P EST MOD 30 MIN: CPT | Performed by: FAMILY MEDICINE

## 2023-10-23 PROCEDURE — 1036F TOBACCO NON-USER: CPT | Performed by: FAMILY MEDICINE

## 2023-10-23 PROCEDURE — 3079F DIAST BP 80-89 MM HG: CPT | Performed by: FAMILY MEDICINE

## 2023-10-23 PROCEDURE — 3075F SYST BP GE 130 - 139MM HG: CPT | Performed by: FAMILY MEDICINE

## 2023-10-23 PROCEDURE — 3008F BODY MASS INDEX DOCD: CPT | Performed by: FAMILY MEDICINE

## 2023-10-23 RX ORDER — SULFAMETHOXAZOLE AND TRIMETHOPRIM 800; 160 MG/1; MG/1
1 TABLET ORAL 2 TIMES DAILY
Qty: 20 TABLET | Refills: 0 | Status: SHIPPED | OUTPATIENT
Start: 2023-10-23 | End: 2023-11-02

## 2023-10-23 RX ORDER — TRIAMCINOLONE ACETONIDE 1 MG/G
CREAM TOPICAL 2 TIMES DAILY
Qty: 30 G | Refills: 0 | Status: SHIPPED | OUTPATIENT
Start: 2023-10-23

## 2023-10-23 NOTE — PROGRESS NOTES
Ashia Martinez is a 48 y.o. female here today for   Chief Complaint   Patient presents with    Rash     All over body         HPI         Current Outpatient Medications:     amoxicillin (Amoxil) 500 mg capsule, Take by mouth., Disp: , Rfl:     fluticasone (Flonase) 50 mcg/actuation nasal spray, Administer 1 spray into affected nostril(s) once daily., Disp: , Rfl:     levocetirizine (Xyzal) 5 mg tablet, Take 1 tablet (5 mg) by mouth once daily., Disp: , Rfl:     losartan (Cozaar) 50 mg tablet, Take 1 tablet (50 mg) by mouth once daily., Disp: 90 tablet, Rfl: 1    montelukast (Singulair) 10 mg tablet, Take 1 tablet (10 mg) by mouth once daily., Disp: , Rfl:     olopatadine (Patanol) 0.1 % ophthalmic solution, Administer into affected eye(s) twice a day., Disp: , Rfl:     predniSONE (Deltasone) 20 mg tablet, Take 1 tablet (20 mg) by mouth 2 times a day. For 5 days, Disp: , Rfl:     Patient Active Problem List   Diagnosis    Allergic conjunctivitis    Closed displaced fracture of distal phalanx of ring finger with routine healing    Primary hypertension    Rash    Old inferior wall myocardial infarction    Seasonal allergic rhinitis    Skin infection    Hypokalemia    Lip laceration    Class 2 severe obesity due to excess calories with serious comorbidity and body mass index (BMI) of 37.0 to 37.9 in adult (CMS/[facility])    Syncope    Nasal congestion    Acute non-recurrent frontal sinusitis    Antibiotic-induced yeast infection         No results found for this or any previous visit (from the past 672 hour(s)).     Objective    Visit Vitals  There were no vitals taken for this visit.    There is no height or weight on file to calculate BMI.     Physical Exam       Assessment    No diagnosis found.

## 2023-10-23 NOTE — PROGRESS NOTES
"Ashia Martinez is a 48 y.o. female here today for   Chief Complaint   Patient presents with    Abrasion     Open spots on arms and back, swollen and warm to touch        HPI   Woke with \"rash\" of back yesterday on arms.  Some on back too.  Burns and itches.  Had same rash before and dermatologist biopsied but inconclusive.  Dr. Vivian Hadley - notes and path reviewed.  She actually has developed multiple separate lesions or areas on her arms with surrounding induration and erythema.  There are no pustules.  She also developed 1 on her right superior shoulder today.  The previous pathology results said that the findings were suggestive of insect bites.  This has been treated with Bactrim and symptomatic medications in the past and did resolve over time.  Patient denies any chance of insect bites, spider bites, flea bites, bedbugs.  She says no one else in the house has any lesions.  She is not having any other symptoms on review.      Current Outpatient Medications:     amoxicillin (Amoxil) 500 mg capsule, Take by mouth., Disp: , Rfl:     fluticasone (Flonase) 50 mcg/actuation nasal spray, Administer 1 spray into affected nostril(s) once daily., Disp: , Rfl:     levocetirizine (Xyzal) 5 mg tablet, Take 1 tablet (5 mg) by mouth once daily., Disp: , Rfl:     losartan (Cozaar) 50 mg tablet, Take 1 tablet (50 mg) by mouth once daily., Disp: 90 tablet, Rfl: 1    montelukast (Singulair) 10 mg tablet, Take 1 tablet (10 mg) by mouth once daily., Disp: , Rfl:     olopatadine (Patanol) 0.1 % ophthalmic solution, Administer into affected eye(s) twice a day., Disp: , Rfl:     predniSONE (Deltasone) 20 mg tablet, Take 1 tablet (20 mg) by mouth 2 times a day. For 5 days, Disp: , Rfl:     sulfamethoxazole-trimethoprim (Bactrim DS) 800-160 mg tablet, Take 1 tablet by mouth 2 times a day for 10 days., Disp: 20 tablet, Rfl: 0    triamcinolone (Kenalog) 0.1 % cream, Apply topically 2 times a day., Disp: 30 g, Rfl: 0    Patient Active " "Problem List   Diagnosis    Allergic conjunctivitis    Closed displaced fracture of distal phalanx of ring finger with routine healing    Primary hypertension    Rash    Old inferior wall myocardial infarction    Seasonal allergic rhinitis    Skin infection    Hypokalemia    Lip laceration    Class 2 severe obesity due to excess calories with serious comorbidity and body mass index (BMI) of 37.0 to 37.9 in adult (CMS/[facility])    Syncope    Nasal congestion    Acute non-recurrent frontal sinusitis    Antibiotic-induced yeast infection    Infected insect bites of multiple sites         No results found for this or any previous visit (from the past 672 hour(s)).     Objective    Visit Vitals  /82   Pulse 90   Temp 36.4 °C (97.6 °F)   Resp 18   Ht 1.676 m (5' 6\")   Wt 106 kg (234 lb)   BMI 37.77 kg/m²     Body mass index is 37.77 kg/m².     Physical Exam   Skin-patient has 3 separate areas on her left arm and 2 on her right arm with what appears to be a central irritation and surrounding erythema with mild induration.  These areas are slightly warm.  There is no pus or discharge.  She also has 1 similar area on the right superior shoulder.    Assessment    1. Infected insect bites of multiple sites  sulfamethoxazole-trimethoprim (Bactrim DS) 800-160 mg tablet, triamcinolone (Kenalog) 0.1 % cream   The appearance of this rash likely represents some type of skin infection secondary to possible insect bites as previously sewn on skin biopsy by her dermatologist.  I will treat with Bactrim twice daily and triamcinolone cream.  The patient says she called her dermatologist but they do not have an appointment available until February of next year.  I recommend that she call back and insist on an appointment more quickly if this rash does not improve.  If she cannot get an appointment she should let us know and I will try to refer her to a different dermatologist.           "

## 2023-10-29 DIAGNOSIS — J30.2 OTHER SEASONAL ALLERGIC RHINITIS: ICD-10-CM

## 2023-10-29 DIAGNOSIS — H10.10 ACUTE ATOPIC CONJUNCTIVITIS, UNSPECIFIED EYE: ICD-10-CM

## 2023-10-30 RX ORDER — LEVOCETIRIZINE DIHYDROCHLORIDE 5 MG/1
5 TABLET, FILM COATED ORAL DAILY
Qty: 90 TABLET | Refills: 1 | Status: SHIPPED | OUTPATIENT
Start: 2023-10-30 | End: 2024-02-20 | Stop reason: SDUPTHER

## 2023-10-30 RX ORDER — MONTELUKAST SODIUM 10 MG/1
10 TABLET ORAL DAILY
Qty: 90 TABLET | Refills: 1 | Status: SHIPPED | OUTPATIENT
Start: 2023-10-30 | End: 2024-02-20 | Stop reason: SDUPTHER

## 2024-01-31 ENCOUNTER — OFFICE VISIT (OUTPATIENT)
Dept: PRIMARY CARE | Facility: CLINIC | Age: 49
End: 2024-01-31
Payer: COMMERCIAL

## 2024-01-31 VITALS
BODY MASS INDEX: 37.77 KG/M2 | SYSTOLIC BLOOD PRESSURE: 124 MMHG | RESPIRATION RATE: 16 BRPM | HEART RATE: 84 BPM | HEIGHT: 66 IN | WEIGHT: 235 LBS | DIASTOLIC BLOOD PRESSURE: 80 MMHG | TEMPERATURE: 97.2 F

## 2024-01-31 DIAGNOSIS — L23.9 ALLERGIC CONTACT DERMATITIS, UNSPECIFIED TRIGGER: Primary | ICD-10-CM

## 2024-01-31 DIAGNOSIS — J45.20 MILD INTERMITTENT ASTHMA WITHOUT COMPLICATION (HHS-HCC): ICD-10-CM

## 2024-01-31 PROCEDURE — 3074F SYST BP LT 130 MM HG: CPT | Performed by: FAMILY MEDICINE

## 2024-01-31 PROCEDURE — 3008F BODY MASS INDEX DOCD: CPT | Performed by: FAMILY MEDICINE

## 2024-01-31 PROCEDURE — 3079F DIAST BP 80-89 MM HG: CPT | Performed by: FAMILY MEDICINE

## 2024-01-31 PROCEDURE — 99214 OFFICE O/P EST MOD 30 MIN: CPT | Performed by: FAMILY MEDICINE

## 2024-01-31 PROCEDURE — 1036F TOBACCO NON-USER: CPT | Performed by: FAMILY MEDICINE

## 2024-01-31 RX ORDER — BETAMETHASONE DIPROPIONATE 0.5 MG/G
CREAM TOPICAL 2 TIMES DAILY
Qty: 45 G | Refills: 0 | Status: SHIPPED | OUTPATIENT
Start: 2024-01-31 | End: 2024-02-20 | Stop reason: SDUPTHER

## 2024-01-31 RX ORDER — ALBUTEROL SULFATE 90 UG/1
2 AEROSOL, METERED RESPIRATORY (INHALATION) EVERY 6 HOURS PRN
Qty: 18 G | Refills: 1 | Status: SHIPPED | OUTPATIENT
Start: 2024-01-31 | End: 2024-02-20 | Stop reason: SDUPTHER

## 2024-01-31 NOTE — PROGRESS NOTES
Ashia Martinez is a 48 y.o. female here today for   Chief Complaint   Patient presents with    Rash     On arms, itchy         HPI   Rash started on arm since one month ago.  Itchy and has spread and on both forearms.  Nothing new in environment.  It is a cluster of papules about the size of a baseball on the left forearm.  There are also a few lesions on the right forearm.  There is another cluster near the left wrist about 2 inches in size.  There are no pustules or blisters.  There is no induration or tenderness.      Current Outpatient Medications:     fluticasone (Flonase) 50 mcg/actuation nasal spray, Administer 1 spray into affected nostril(s) once daily., Disp: , Rfl:     levocetirizine (Xyzal) 5 mg tablet, TAKE 1 TABLET BY MOUTH ONCE DAILY, Disp: 90 tablet, Rfl: 1    losartan (Cozaar) 50 mg tablet, Take 1 tablet (50 mg) by mouth once daily., Disp: 90 tablet, Rfl: 1    montelukast (Singulair) 10 mg tablet, TAKE 1 TABLET BY MOUTH ONCE DAILY, Disp: 90 tablet, Rfl: 1    olopatadine (Patanol) 0.1 % ophthalmic solution, Administer into affected eye(s) twice a day., Disp: , Rfl:     triamcinolone (Kenalog) 0.1 % cream, Apply topically 2 times a day., Disp: 30 g, Rfl: 0    albuterol 90 mcg/actuation inhaler, Inhale 2 puffs every 6 hours if needed for wheezing., Disp: 18 g, Rfl: 1    amoxicillin (Amoxil) 500 mg capsule, Take by mouth., Disp: , Rfl:     betamethasone dipropionate 0.05 % cream, Apply topically 2 times a day., Disp: 45 g, Rfl: 0    predniSONE (Deltasone) 20 mg tablet, Take 1 tablet (20 mg) by mouth 2 times a day. For 5 days, Disp: , Rfl:     Patient Active Problem List   Diagnosis    Allergic conjunctivitis    Closed displaced fracture of distal phalanx of ring finger with routine healing    Primary hypertension    Rash    Old inferior wall myocardial infarction    Seasonal allergic rhinitis    Skin infection    Hypokalemia    Lip laceration    Class 2 severe obesity due to excess calories with serious  "comorbidity and body mass index (BMI) of 37.0 to 37.9 in adult (CMS/[facility])    Syncope    Nasal congestion    Acute non-recurrent frontal sinusitis    Antibiotic-induced yeast infection    Infected insect bites of multiple sites         No results found for this or any previous visit (from the past 672 hour(s)).     Objective    Visit Vitals    Visit Vitals  /80   Pulse 84   Temp 36.2 °C (97.2 °F)   Resp 16   Ht 1.676 m (5' 6\")   Wt 107 kg (235 lb)   BMI 37.93 kg/m²   Smoking Status Never   BSA 2.23 m²       Body mass index is 37.93 kg/m².     Physical Exam   Skin-patient with clusters of a maculopapular rash on her arms as above.  There is really no underlying erythema or induration or tenderness or warmth.  No blisters or pustules.  No rash elsewhere.    Assessment    1. Allergic contact dermatitis, unspecified trigger  betamethasone dipropionate 0.05 % cream   This is likely some type of allergic or irritant contact dermatitis but the patient cannot identify the source on review.  There is no evidence of a bacterial infection.  We will treat with betamethasone cream twice daily for up to 2 weeks.  She can also take over-the-counter Zyrtec as needed to help with itching.  She can use over-the-counter Benadryl cream for this also.  I recommend to call us and make a follow up appointment if sxs worsen or do not resolve.       2. Mild intermittent asthma without complication  albuterol 90 mcg/actuation inhaler   She uses albuterol very rarely but would like a refill for as needed use.         "

## 2024-02-09 DIAGNOSIS — I10 ESSENTIAL HYPERTENSION: ICD-10-CM

## 2024-02-09 RX ORDER — LOSARTAN POTASSIUM 50 MG/1
50 TABLET ORAL DAILY
Qty: 90 TABLET | Refills: 0 | OUTPATIENT
Start: 2024-02-09

## 2024-02-09 RX ORDER — LOSARTAN POTASSIUM 50 MG/1
50 TABLET ORAL DAILY
Qty: 90 TABLET | Refills: 1 | Status: SHIPPED | OUTPATIENT
Start: 2024-02-09

## 2024-02-09 RX ORDER — LOSARTAN POTASSIUM 50 MG/1
50 TABLET ORAL DAILY
Qty: 30 TABLET | Refills: 0 | Status: CANCELLED | OUTPATIENT
Start: 2024-02-09

## 2024-02-09 NOTE — TELEPHONE ENCOUNTER
1) Pt scheduled follow up visit for 2/20/24. Only has 2 pills left of Losartan. Can pt have refill to hold her over?  2) Pt has been using rx cream for rash but it has not been working. Rash has spread to her legs and she is almost out of cream. Can rx for stronger cream be called in to pharm or does she need to be seen? Please advise.   
Losartan 30 day fill pended, advise on cream.  
Pt did schedule with dermatology but it will not be for 3 months.   
No

## 2024-02-09 NOTE — TELEPHONE ENCOUNTER
Patient called and stated her rash on her arms has now spread to her legs and the Kenalog cream and the Benadryl cream are not working. Please advise.

## 2024-02-20 ENCOUNTER — OFFICE VISIT (OUTPATIENT)
Dept: PRIMARY CARE | Facility: CLINIC | Age: 49
End: 2024-02-20
Payer: COMMERCIAL

## 2024-02-20 VITALS
SYSTOLIC BLOOD PRESSURE: 122 MMHG | DIASTOLIC BLOOD PRESSURE: 80 MMHG | HEIGHT: 66 IN | HEART RATE: 86 BPM | RESPIRATION RATE: 16 BRPM | WEIGHT: 231 LBS | BODY MASS INDEX: 37.12 KG/M2 | TEMPERATURE: 97.1 F

## 2024-02-20 DIAGNOSIS — J45.20 MILD INTERMITTENT ASTHMA WITHOUT COMPLICATION (HHS-HCC): ICD-10-CM

## 2024-02-20 DIAGNOSIS — L23.9 ALLERGIC CONTACT DERMATITIS, UNSPECIFIED TRIGGER: ICD-10-CM

## 2024-02-20 DIAGNOSIS — J30.2 SEASONAL ALLERGIC RHINITIS, UNSPECIFIED TRIGGER: ICD-10-CM

## 2024-02-20 DIAGNOSIS — Z12.31 ENCOUNTER FOR SCREENING MAMMOGRAM FOR MALIGNANT NEOPLASM OF BREAST: ICD-10-CM

## 2024-02-20 PROCEDURE — 3008F BODY MASS INDEX DOCD: CPT | Performed by: FAMILY MEDICINE

## 2024-02-20 PROCEDURE — 99214 OFFICE O/P EST MOD 30 MIN: CPT | Performed by: FAMILY MEDICINE

## 2024-02-20 PROCEDURE — 3079F DIAST BP 80-89 MM HG: CPT | Performed by: FAMILY MEDICINE

## 2024-02-20 PROCEDURE — 3074F SYST BP LT 130 MM HG: CPT | Performed by: FAMILY MEDICINE

## 2024-02-20 PROCEDURE — 1036F TOBACCO NON-USER: CPT | Performed by: FAMILY MEDICINE

## 2024-02-20 RX ORDER — FLUTICASONE PROPIONATE 50 MCG
1 SPRAY, SUSPENSION (ML) NASAL DAILY
Qty: 16 G | Refills: 1 | Status: SHIPPED | OUTPATIENT
Start: 2024-02-20

## 2024-02-20 RX ORDER — LEVOCETIRIZINE DIHYDROCHLORIDE 5 MG/1
5 TABLET, FILM COATED ORAL DAILY
Qty: 90 TABLET | Refills: 1 | Status: SHIPPED | OUTPATIENT
Start: 2024-02-20

## 2024-02-20 RX ORDER — MONTELUKAST SODIUM 10 MG/1
10 TABLET ORAL DAILY
Qty: 90 TABLET | Refills: 1 | Status: SHIPPED | OUTPATIENT
Start: 2024-02-20

## 2024-02-20 RX ORDER — TRIAMCINOLONE ACETONIDE 1 MG/G
CREAM TOPICAL 2 TIMES DAILY
Qty: 30 G | Refills: 1 | Status: CANCELLED | OUTPATIENT
Start: 2024-02-20

## 2024-02-20 RX ORDER — BETAMETHASONE DIPROPIONATE 0.5 MG/G
CREAM TOPICAL 2 TIMES DAILY
Qty: 45 G | Refills: 1 | Status: SHIPPED | OUTPATIENT
Start: 2024-02-20

## 2024-02-20 RX ORDER — ALBUTEROL SULFATE 90 UG/1
2 AEROSOL, METERED RESPIRATORY (INHALATION) EVERY 6 HOURS PRN
Qty: 18 G | Refills: 6 | Status: SHIPPED | OUTPATIENT
Start: 2024-02-20 | End: 2024-02-22 | Stop reason: SDUPTHER

## 2024-02-20 NOTE — PROGRESS NOTES
Ashia Martinez is a 48 y.o. female here today for   Chief Complaint   Patient presents with    Hypertension    Asthma    Allergies    recheck skin        HPI     HTN recheck -- Patient denies chest pain, SOB, edema, palpitations on review.  Taking medication correctly and denies any side effects.         Recheck Allergies -patient reports allergies are well controlled with current medications.  There are no side effects from the current medications.  Patient would like to continue with same treatment.    Rash did improve of arms and nearly resolve.  But now with same rash of posterior lower legs.  On review there is no known cause or correlation with anything.  The rash occurs in patches and is very itchy.  I have referred her to a dermatologist and she had appointment next week but the dermatologist cancel and now she has an appointment in April.  I had given her betamethasone cream at her last visit for the rash on her arms and it did help to clear.    The patient is also due for mammogram and her last mammogram was in 2022.    Current Outpatient Medications:     losartan (Cozaar) 50 mg tablet, TAKE 1 TABLET BY MOUTH ONCE DAILY, Disp: 90 tablet, Rfl: 1    olopatadine (Patanol) 0.1 % ophthalmic solution, Administer into affected eye(s) twice a day., Disp: , Rfl:     triamcinolone (Kenalog) 0.1 % cream, Apply topically 2 times a day., Disp: 30 g, Rfl: 0    albuterol 90 mcg/actuation inhaler, Inhale 2 puffs every 6 hours if needed for wheezing., Disp: 18 g, Rfl: 6    betamethasone dipropionate 0.05 % cream, Apply topically 2 times a day., Disp: 45 g, Rfl: 1    fluticasone (Flonase) 50 mcg/actuation nasal spray, Administer 1 spray into each nostril once daily., Disp: 16 g, Rfl: 1    levocetirizine (Xyzal) 5 mg tablet, Take 1 tablet (5 mg) by mouth once daily., Disp: 90 tablet, Rfl: 1    montelukast (Singulair) 10 mg tablet, Take 1 tablet (10 mg) by mouth once daily., Disp: 90 tablet, Rfl: 1    Patient Active Problem  "List   Diagnosis    Allergic conjunctivitis    Closed displaced fracture of distal phalanx of ring finger with routine healing    Primary hypertension    Rash    Old inferior wall myocardial infarction    Seasonal allergic rhinitis    Skin infection    Hypokalemia    Lip laceration    Class 2 severe obesity due to excess calories with serious comorbidity and body mass index (BMI) of 37.0 to 37.9 in adult (CMS/HCC)    Syncope    Nasal congestion    Acute non-recurrent frontal sinusitis    Antibiotic-induced yeast infection    Infected insect bites of multiple sites    Allergic contact dermatitis    Mild intermittent asthma without complication         No results found for this or any previous visit (from the past 672 hour(s)).     Objective    Visit Vitals    Visit Vitals  /80   Pulse 86   Temp 36.2 °C (97.1 °F)   Resp 16   Ht 1.676 m (5' 6\")   Wt 105 kg (231 lb)   BMI 37.28 kg/m²   Smoking Status Never   BSA 2.21 m²       Body mass index is 37.28 kg/m².     Physical Exam   General - Not in acute distress and cooperative.  Build & Nutrition - Well developed  Posture - Normal  Gait - Normal  Mental Status - alert and oriented x 3    Head - Normocephalic    Neck - Thyroid normal size    Eyes - Bilateral - Sclera clear and lids pink without edema or mass.      Skin -she has a few small clusters of macules of the posterior lower legs.  The previous rash on her arms seems to have almost completely cleared.    Lungs - Clear to auscultation and normal breathing effort    Cardiovascular - RRR and no murmurs, rubs or thrill.    Peripheral Vascular - Bilateral - no edema present    Neuropsychiatric - normal mood and affect    Breasts-no lumps or masses or nipple discharge.  No skin changes.    Assessment    1. Encounter for screening mammogram for malignant neoplasm of breast  BI mammo bilateral screening tomosynthesis   I ordered a screening mammogram.     2. Allergic contact dermatitis, unspecified trigger  " betamethasone dipropionate 0.05 % cream   I am not certain what is causing this rash especially since it is recurrent.  She is already on a good antihistamine and Singulair.  We will use betamethasone cream again twice daily for up to 1 week as needed.  She will see the dermatologist in April for further evaluation.     3. Seasonal allergic rhinitis, unspecified trigger  montelukast (Singulair) 10 mg tablet, levocetirizine (Xyzal) 5 mg tablet, fluticasone (Flonase) 50 mcg/actuation nasal spray   Condition well controlled.  No change in current treatment regimen.  Refill given of current medication.  Make a follow up appointment with me for recheck in 6 months.       4. Mild intermittent asthma without complication  albuterol 90 mcg/actuation inhaler   Condition well controlled.  No change in current treatment regimen.  Refill given of current medication.  Make a follow up appointment with me for recheck in 6 months.

## 2024-02-22 ENCOUNTER — TELEPHONE (OUTPATIENT)
Dept: PRIMARY CARE | Facility: CLINIC | Age: 49
End: 2024-02-22
Payer: COMMERCIAL

## 2024-02-22 DIAGNOSIS — J45.20 MILD INTERMITTENT ASTHMA WITHOUT COMPLICATION (HHS-HCC): ICD-10-CM

## 2024-02-22 RX ORDER — ALBUTEROL SULFATE 90 UG/1
2 AEROSOL, METERED RESPIRATORY (INHALATION) EVERY 6 HOURS PRN
Qty: 18 G | Refills: 3 | Status: SHIPPED | OUTPATIENT
Start: 2024-02-22 | End: 2025-02-21

## 2024-03-08 ENCOUNTER — HOSPITAL ENCOUNTER (OUTPATIENT)
Dept: RADIOLOGY | Facility: HOSPITAL | Age: 49
Discharge: HOME | End: 2024-03-08
Payer: COMMERCIAL

## 2024-03-08 DIAGNOSIS — Z12.31 ENCOUNTER FOR SCREENING MAMMOGRAM FOR MALIGNANT NEOPLASM OF BREAST: ICD-10-CM

## 2024-03-08 PROCEDURE — 77067 SCR MAMMO BI INCL CAD: CPT

## 2024-03-08 PROCEDURE — 77067 SCR MAMMO BI INCL CAD: CPT | Performed by: RADIOLOGY

## 2024-03-08 PROCEDURE — 77063 BREAST TOMOSYNTHESIS BI: CPT | Performed by: RADIOLOGY

## 2024-06-19 ENCOUNTER — APPOINTMENT (OUTPATIENT)
Dept: PRIMARY CARE | Facility: CLINIC | Age: 49
End: 2024-06-19
Payer: COMMERCIAL

## 2024-06-19 VITALS
TEMPERATURE: 97.9 F | HEART RATE: 80 BPM | DIASTOLIC BLOOD PRESSURE: 90 MMHG | RESPIRATION RATE: 16 BRPM | SYSTOLIC BLOOD PRESSURE: 142 MMHG | HEIGHT: 66 IN | BODY MASS INDEX: 35.68 KG/M2 | WEIGHT: 222 LBS

## 2024-06-19 DIAGNOSIS — I10 PRIMARY HYPERTENSION: ICD-10-CM

## 2024-06-19 DIAGNOSIS — R06.09 DYSPNEA ON EXERTION: ICD-10-CM

## 2024-06-19 DIAGNOSIS — Z12.11 SCREENING FOR COLORECTAL CANCER: ICD-10-CM

## 2024-06-19 DIAGNOSIS — R06.2 WHEEZES: ICD-10-CM

## 2024-06-19 DIAGNOSIS — Z11.59 NEED FOR HEPATITIS C SCREENING TEST: ICD-10-CM

## 2024-06-19 DIAGNOSIS — Z00.00 ROUTINE GENERAL MEDICAL EXAMINATION AT HEALTH CARE FACILITY: Primary | ICD-10-CM

## 2024-06-19 DIAGNOSIS — Z12.12 SCREENING FOR COLORECTAL CANCER: ICD-10-CM

## 2024-06-19 DIAGNOSIS — Z11.4 SCREENING FOR HIV WITHOUT PRESENCE OF RISK FACTORS: ICD-10-CM

## 2024-06-19 DIAGNOSIS — Z00.00 ENCOUNTER FOR ANNUAL WELLNESS EXAM IN MEDICARE PATIENT: ICD-10-CM

## 2024-06-19 PROCEDURE — G0439 PPPS, SUBSEQ VISIT: HCPCS | Performed by: FAMILY MEDICINE

## 2024-06-19 PROCEDURE — 99213 OFFICE O/P EST LOW 20 MIN: CPT | Performed by: FAMILY MEDICINE

## 2024-06-19 PROCEDURE — 3008F BODY MASS INDEX DOCD: CPT | Performed by: FAMILY MEDICINE

## 2024-06-19 PROCEDURE — 3077F SYST BP >= 140 MM HG: CPT | Performed by: FAMILY MEDICINE

## 2024-06-19 PROCEDURE — 3080F DIAST BP >= 90 MM HG: CPT | Performed by: FAMILY MEDICINE

## 2024-06-19 RX ORDER — FOLIC ACID 1 MG/1
1 TABLET ORAL
COMMUNITY
Start: 2024-06-07

## 2024-06-19 RX ORDER — METHOTREXATE 2.5 MG/1
15 TABLET ORAL WEEKLY
COMMUNITY
Start: 2024-06-07

## 2024-06-19 ASSESSMENT — ACTIVITIES OF DAILY LIVING (ADL)
MANAGING_FINANCES: INDEPENDENT
GROCERY_SHOPPING: INDEPENDENT
TAKING_MEDICATION: INDEPENDENT
BATHING: INDEPENDENT
DOING_HOUSEWORK: INDEPENDENT
DRESSING: INDEPENDENT

## 2024-06-19 ASSESSMENT — PATIENT HEALTH QUESTIONNAIRE - PHQ9
2. FEELING DOWN, DEPRESSED OR HOPELESS: NOT AT ALL
SUM OF ALL RESPONSES TO PHQ9 QUESTIONS 1 AND 2: 0
1. LITTLE INTEREST OR PLEASURE IN DOING THINGS: NOT AT ALL

## 2024-06-19 NOTE — PROGRESS NOTES
-  History Of Present Illness  Ashia Martinez is a 48 y.o. female presenting for a Medicare Annual Wellness Exam.  Patient is here for a periodic health exam.  I reviewed previous preventative health measures including screening tests, immunizations and labs.  I reviewed screenings administered by my staff today.    She is still having some SOB with exertion at times especially in the heat.  Taking singulair and xyzal.  No h/o tob use ever.  Never dxed with asthma.  H/O bad JERAMY.       PREVIOUS PREVENTATIVE HEALTH  Colonoscopy : No  Cologuard : No  Mammogram : Yes    Date: 3/8/2024  Hepatitis C Antibody : No  Prevnar : No  Tdap within 10 years : Yes  Yearly Flu Shot : Yes    CURRENT FINDINGS  Falls : No  Problems with ADL's :  No  Healthy Diet : Yes  Exercise :  No  Vision or Hearing Problems : No  Depression Issues : No  Alcohol Use : No  Tobacco Use : No         Past Medical History  Patient Active Problem List   Diagnosis    Allergic conjunctivitis    Closed displaced fracture of distal phalanx of ring finger with routine healing    Primary hypertension    Rash    Old inferior wall myocardial infarction    Seasonal allergic rhinitis    Skin infection    Hypokalemia    Lip laceration    Class 2 severe obesity due to excess calories with serious comorbidity and body mass index (BMI) of 37.0 to 37.9 in adult ([facility])    Syncope    Nasal congestion    Acute non-recurrent frontal sinusitis    Antibiotic-induced yeast infection    Infected insect bites of multiple sites    Allergic contact dermatitis    Mild intermittent asthma without complication ([facility])    Dyspnea on exertion    Wheezes    Encounter for annual wellness exam in Medicare patient       Past Surgical History:   Procedure Laterality Date    OTHER SURGICAL HISTORY  2019    Hand surgery    OTHER SURGICAL HISTORY  2019     section    OTHER SURGICAL HISTORY  2019    Facial surgery        Current Outpatient Medications:     albuterol  (Ventolin HFA) 90 mcg/actuation inhaler, Inhale 2 puffs every 6 hours if needed for wheezing., Disp: 18 g, Rfl: 3    betamethasone dipropionate 0.05 % cream, Apply topically 2 times a day., Disp: 45 g, Rfl: 1    fluticasone (Flonase) 50 mcg/actuation nasal spray, Administer 1 spray into each nostril once daily., Disp: 16 g, Rfl: 1    folic acid (Folvite) 1 mg tablet, Take 1 tablet (1 mg) by mouth once daily., Disp: , Rfl:     levocetirizine (Xyzal) 5 mg tablet, Take 1 tablet (5 mg) by mouth once daily., Disp: 90 tablet, Rfl: 1    methotrexate (Trexall) 2.5 mg tablet, Take 6 tablets (15 mg total) by mouth once a week., Disp: , Rfl:     montelukast (Singulair) 10 mg tablet, Take 1 tablet (10 mg) by mouth once daily., Disp: 90 tablet, Rfl: 1    olopatadine (Patanol) 0.1 % ophthalmic solution, Administer into affected eye(s) twice a day., Disp: , Rfl:     triamcinolone (Kenalog) 0.1 % cream, Apply topically 2 times a day., Disp: 30 g, Rfl: 0    losartan (Cozaar) 50 mg tablet, TAKE 1 TABLET BY MOUTH ONCE DAILY (Patient not taking: Reported on 6/19/2024), Disp: 90 tablet, Rfl: 1   Immunization History   Administered Date(s) Administered    Influenza, seasonal, injectable 10/28/2022    Pneumococcal polysaccharide vaccine, 23-valent, age 2 years and older (PNEUMOVAX 23) 06/29/2020    Tdap vaccine, age 7 year and older (BOOSTRIX, ADACEL) 06/29/2020, 08/10/2023        Social History  Social History     Socioeconomic History    Marital status: Single     Spouse name: Not on file    Number of children: Not on file    Years of education: Not on file    Highest education level: Not on file   Occupational History    Not on file   Tobacco Use    Smoking status: Never    Smokeless tobacco: Never   Vaping Use    Vaping status: Never Used   Substance and Sexual Activity    Alcohol use: Never    Drug use: Never    Sexual activity: Not on file   Other Topics Concern    Not on file   Social History Narrative    Not on file     Social  "Determinants of Health     Financial Resource Strain: Low Risk  (6/7/2023)    Received from [facility]    Overall Financial Resource Strain (CARDIA)     Difficulty of Paying Living Expenses: Not hard at all   Food Insecurity: Not on file (6/7/2023)   Transportation Needs: Unknown (6/7/2023)    Received from [facility]    PRAPARE - Transportation     Lack of Transportation (Medical): Not on file     Lack of Transportation (Non-Medical): No   Physical Activity: Not on file   Stress: Not on file   Social Connections: Not on file   Intimate Partner Violence: Not on file   Housing Stability: Unknown (6/7/2023)    Received from [facility]    Housing Stability Vital Sign     Unable to Pay for Housing in the Last Year: Not on file     Number of Places Lived in the Last Year: Not on file     Unstable Housing in the Last Year: No        reports no history of alcohol use.    reports that she has never smoked. She has never used smokeless tobacco.    reports no history of drug use.           Allergies  Ace inhibitors and Amoxicillin      Physical Exam  Visit Vitals  /90   Pulse 80   Temp 36.6 °C (97.9 °F)   Resp 16   Ht 1.676 m (5' 6\")   Wt 101 kg (222 lb)   BMI 35.83 kg/m²   OB Status Having periods   Smoking Status Never   BSA 2.17 m²       Physical Exam  Vitals and nursing note reviewed.   Constitutional:       General: She is not in acute distress.     Appearance: Normal appearance.   HENT:      Head: Normocephalic and atraumatic.      Right Ear: Tympanic membrane, ear canal and external ear normal.      Left Ear: Tympanic membrane, ear canal and external ear normal.      Nose: Nose normal.      Mouth/Throat:      Mouth: Mucous membranes are moist.      Pharynx: Oropharynx is clear.   Eyes:      Extraocular Movements: Extraocular movements intact.      Conjunctiva/sclera: Conjunctivae normal.      Pupils: Pupils are equal, round, and reactive to " light.   Cardiovascular:      Rate and Rhythm: Normal rate and regular rhythm.      Pulses: Normal pulses.      Heart sounds: Normal heart sounds. No murmur heard.     No friction rub. No gallop.   Pulmonary:      Effort: Pulmonary effort is normal. No respiratory distress.      Breath sounds: Normal breath sounds.   Chest:   Breasts:     Right: Normal. No mass, nipple discharge or skin change.      Left: Normal. No mass, nipple discharge or skin change.   Abdominal:      General: Abdomen is flat. Bowel sounds are normal. There is no distension.      Palpations: Abdomen is soft.      Tenderness: There is no abdominal tenderness.   Musculoskeletal:         General: Normal range of motion.      Cervical back: Normal range of motion and neck supple.   Lymphadenopathy:      Cervical: No cervical adenopathy.      Upper Body:      Right upper body: No axillary adenopathy.      Left upper body: No axillary adenopathy.   Skin:     General: Skin is warm and dry.      Findings: No lesion or rash.   Neurological:      General: No focal deficit present.      Mental Status: She is alert. Mental status is at baseline.   Psychiatric:         Mood and Affect: Mood normal.         Behavior: Behavior normal.         Thought Content: Thought content normal.         Judgment: Judgment normal.                 Assessment      1. Routine general medical examination at health care facility  1 Year Follow Up In [facility] - PCP - Wellness Exam      2. Dyspnea on exertion  Complete Pulmonary Function Test Pre/Post Bronchodialator (Spirometry Pre/Post/DLCO/Lung Volumes)   We are going to do pulmonary function tests pre and postbronchodilator to rule out asthma as a cause of her shortness of breath and wheezing.  No signs or symptoms of an immediately serious or dangerous etiology.     3. Wheezes  Complete Pulmonary Function Test Pre/Post Bronchodialator (Spirometry Pre/Post/DLCO/Lung Volumes)      4. Primary hypertension  Lipid  Panel   Appropriate labs ordered or reviewed.     5. Encounter for annual wellness exam in Medicare patient        6. Need for hepatitis C screening test  Hepatitis C antibody      7. Screening for colorectal cancer  Cologuard® colon cancer screening, Cologuard® colon cancer screening      8. Screening for HIV without presence of risk factors  HIV 1/2 Antigen/Antibody Screen with Reflex to Confirmation           I recommend regular exercise, balanced diet, regular dental exams, and healthy habits.  Patient denies depression sxs.  Patient is able to perform all ADL's without assistance.  I reminded patient to get yearly eye exams with glaucoma screening.  I recommend to eat plenty of plant foods (such as whole-grain products, fruits, and vegetables) and a moderate amount of lean and low-fat, animal-based food (meat and dairy products).  When shopping, choose lean meats, fish, and poultry. I recommend to try to get regular aerobic exercise.  I recommend a yearly flu shot in the fall and I recommend a yearly wellness exam.            Orders Placed This Encounter   Procedures    Lipid Panel    Cologuard® colon cancer screening    Hepatitis C antibody    HIV 1/2 Antigen/Antibody Screen with Reflex to Confirmation    Complete Pulmonary Function Test Pre/Post Bronchodialator (Spirometry Pre/Post/DLCO/Lung Volumes)        New Medications Ordered This Visit   Medications    folic acid (Folvite) 1 mg tablet     Sig: Take 1 tablet (1 mg) by mouth once daily.    methotrexate (Trexall) 2.5 mg tablet     Sig: Take 6 tablets (15 mg total) by mouth once a week.                    Dhiraj Alatorre MD

## 2024-06-26 ENCOUNTER — HOSPITAL ENCOUNTER (OUTPATIENT)
Dept: RESPIRATORY THERAPY | Facility: HOSPITAL | Age: 49
Discharge: HOME | End: 2024-06-26
Payer: COMMERCIAL

## 2024-06-26 ENCOUNTER — LAB (OUTPATIENT)
Dept: LAB | Facility: LAB | Age: 49
End: 2024-06-26
Payer: COMMERCIAL

## 2024-06-26 DIAGNOSIS — I10 PRIMARY HYPERTENSION: ICD-10-CM

## 2024-06-26 DIAGNOSIS — Z11.59 NEED FOR HEPATITIS C SCREENING TEST: ICD-10-CM

## 2024-06-26 DIAGNOSIS — E87.6 HYPOKALEMIA: ICD-10-CM

## 2024-06-26 DIAGNOSIS — R06.09 DYSPNEA ON EXERTION: ICD-10-CM

## 2024-06-26 DIAGNOSIS — Z11.4 SCREENING FOR HIV WITHOUT PRESENCE OF RISK FACTORS: ICD-10-CM

## 2024-06-26 DIAGNOSIS — R06.2 WHEEZES: ICD-10-CM

## 2024-06-26 LAB
ANION GAP SERPL CALC-SCNC: 9 MMOL/L (ref 10–20)
BUN SERPL-MCNC: 13 MG/DL (ref 6–23)
CALCIUM SERPL-MCNC: 8.7 MG/DL (ref 8.6–10.3)
CHLORIDE SERPL-SCNC: 107 MMOL/L (ref 98–107)
CHOLEST SERPL-MCNC: 148 MG/DL (ref 0–199)
CHOLESTEROL/HDL RATIO: 3.9
CO2 SERPL-SCNC: 27 MMOL/L (ref 21–32)
CREAT SERPL-MCNC: 0.76 MG/DL (ref 0.5–1.05)
EGFRCR SERPLBLD CKD-EPI 2021: >90 ML/MIN/1.73M*2
GLUCOSE SERPL-MCNC: 119 MG/DL (ref 74–99)
HCV AB SER QL: NONREACTIVE
HDLC SERPL-MCNC: 37.5 MG/DL
HIV 1+2 AB+HIV1 P24 AG SERPL QL IA: NONREACTIVE
LDLC SERPL CALC-MCNC: 88 MG/DL
MGC ASCENT PFT - FEV1 - PRE: 3.21
MGC ASCENT PFT - FEV1 - PREDICTED: 2.89
MGC ASCENT PFT - FVC - PRE: 4.13
MGC ASCENT PFT - FVC - PREDICTED: 3.56
NON HDL CHOLESTEROL: 111 MG/DL (ref 0–149)
POTASSIUM SERPL-SCNC: 4.4 MMOL/L (ref 3.5–5.3)
SODIUM SERPL-SCNC: 139 MMOL/L (ref 136–145)
TRIGL SERPL-MCNC: 113 MG/DL (ref 0–149)
VLDL: 23 MG/DL (ref 0–40)

## 2024-06-26 PROCEDURE — 86803 HEPATITIS C AB TEST: CPT

## 2024-06-26 PROCEDURE — 94726 PLETHYSMOGRAPHY LUNG VOLUMES: CPT

## 2024-06-26 PROCEDURE — 80048 BASIC METABOLIC PNL TOTAL CA: CPT

## 2024-06-26 PROCEDURE — 80061 LIPID PANEL: CPT

## 2024-06-26 PROCEDURE — 36415 COLL VENOUS BLD VENIPUNCTURE: CPT

## 2024-06-26 PROCEDURE — 87389 HIV-1 AG W/HIV-1&-2 AB AG IA: CPT

## 2024-06-28 LAB — NONINV COLON CA DNA+OCC BLD SCRN STL QL: NEGATIVE

## 2024-07-01 NOTE — RESULT ENCOUNTER NOTE
Inform patient that the Cologuard screening test was negative.  This must be repeated every 3 years.

## 2024-07-02 ENCOUNTER — TELEPHONE (OUTPATIENT)
Dept: PRIMARY CARE | Facility: CLINIC | Age: 49
End: 2024-07-02
Payer: COMMERCIAL

## 2024-07-02 NOTE — TELEPHONE ENCOUNTER
----- Message from Dhiraj Alatorre MD sent at 7/1/2024  8:24 AM EDT -----  Please inform the patient that her recent pulmonary function tests were normal.

## 2024-07-02 NOTE — TELEPHONE ENCOUNTER
Pt wants to request a steroid inhaler since this test was normal and the albuterol isn't working well.

## 2024-07-19 ENCOUNTER — OFFICE VISIT (OUTPATIENT)
Dept: PRIMARY CARE | Facility: CLINIC | Age: 49
End: 2024-07-19
Payer: COMMERCIAL

## 2024-07-19 VITALS
HEART RATE: 81 BPM | OXYGEN SATURATION: 98 % | DIASTOLIC BLOOD PRESSURE: 76 MMHG | TEMPERATURE: 97.6 F | WEIGHT: 220 LBS | BODY MASS INDEX: 35.51 KG/M2 | SYSTOLIC BLOOD PRESSURE: 116 MMHG | RESPIRATION RATE: 16 BRPM

## 2024-07-19 DIAGNOSIS — L20.89 OTHER ATOPIC DERMATITIS: Primary | ICD-10-CM

## 2024-07-19 PROCEDURE — 3074F SYST BP LT 130 MM HG: CPT

## 2024-07-19 PROCEDURE — 3078F DIAST BP <80 MM HG: CPT

## 2024-07-19 PROCEDURE — 99214 OFFICE O/P EST MOD 30 MIN: CPT

## 2024-07-19 RX ORDER — TRIAMCINOLONE ACETONIDE 1 MG/G
CREAM TOPICAL 2 TIMES DAILY
Qty: 30 G | Refills: 2 | Status: SHIPPED | OUTPATIENT
Start: 2024-07-19

## 2024-07-19 NOTE — PATIENT INSTRUCTIONS
It was nice seeing you in the office today.  Sign up for [employer] to get results instantly.  Obtain labs/imaging as discussed during our visit.   Follow up as needed.   Call the office: [phone] for questions or concerns.  Please allow 48-72 hours for medication refills.    Francisco Hall D.O.   Family Medicine PGY-2, [facility]  07/19/24

## 2024-07-19 NOTE — PROGRESS NOTES
I reviewed the resident/fellow's documentation and discussed the patient with the resident/fellow. I agree with the resident/fellow's medical decision making as documented in the note.     Dhiraj Alatorre MD

## 2024-07-19 NOTE — PROGRESS NOTES
Subjective   Ashia Martinez is a 48 y.o. female who presents for Rash (Pt here today for ongoing rash).  Has b/l UE rash that comes and goes. Photos on her personal phone from the day prior show a worse rash than today. She was previously taking methotrexate daily per [facility] derm for her chronic eczema. She also had skin biopsy at [facility] that showed probable eczema. She states that her symptoms did not occur until stopping that medication.         Objective     /76 (BP Location: Right arm, Patient Position: Sitting)   Pulse 81   Temp 36.4 °C (97.6 °F) (Temporal)   Resp 16   Wt 99.8 kg (220 lb)   SpO2 98%   BMI 35.51 kg/m²   Physical Exam  Constitutional:       Appearance: Normal appearance.   HENT:      Head: Normocephalic and atraumatic.   Eyes:      Extraocular Movements: Extraocular movements intact.   Skin:     General: Skin is warm and dry.      Findings: Rash present.      Comments: There are eczematous lesions on the b/l UE, primarily on the anterior sides. Improved as compared to photos from the day prior. Some of the lesions have a wheal-like appearance that is improved, and now flat. There are no significant skin breakages, bleeding, or signs of infection.    Neurological:      General: No focal deficit present.      Mental Status: She is alert. Mental status is at baseline.   Psychiatric:         Mood and Affect: Mood normal.         Thought Content: Thought content normal.         Assessment/Plan     1. Other atopic dermatitis  - triamcinolone (Kenalog) 0.1 % cream; Apply topically 2 times a day. Use as needed for when rashes appear. Do not use for more than 2 weeks a time.  Dispense: 30 g; Refill: 2  -See above PE for bilateral UE rashes. She has a previous, definitive pathology of her lesions for eczema. Per [facility] derm, will continue with their plan for daily methotrexate for long-term suppression. I have sent additional Rx for topical Kenalog PRN for breakthrough rashes.     Explained instructions  on how to use topical steroids at length. I advised to only use hydrocortisone on the face, and no other Rx. I advised to use only at 1-2 week intervals at maximum. Advised to follow up early next week if rash is not improving or is worsening.       Francisco Hall D.O.   Family Medicine PGY-2, [facility]  07/19/24

## 2024-08-01 ENCOUNTER — APPOINTMENT (OUTPATIENT)
Dept: PRIMARY CARE | Facility: CLINIC | Age: 49
End: 2024-08-01
Payer: COMMERCIAL

## 2024-09-08 ENCOUNTER — HOSPITAL ENCOUNTER (EMERGENCY)
Facility: HOSPITAL | Age: 49
Discharge: HOME | End: 2024-09-08
Payer: COMMERCIAL

## 2024-09-08 ENCOUNTER — APPOINTMENT (OUTPATIENT)
Dept: RADIOLOGY | Facility: HOSPITAL | Age: 49
End: 2024-09-08
Payer: COMMERCIAL

## 2024-09-08 VITALS
RESPIRATION RATE: 18 BRPM | WEIGHT: 220 LBS | BODY MASS INDEX: 35.36 KG/M2 | HEART RATE: 88 BPM | HEIGHT: 66 IN | TEMPERATURE: 98.1 F | DIASTOLIC BLOOD PRESSURE: 76 MMHG | OXYGEN SATURATION: 99 % | SYSTOLIC BLOOD PRESSURE: 135 MMHG

## 2024-09-08 DIAGNOSIS — R10.31 RIGHT LOWER QUADRANT ABDOMINAL PAIN: Primary | ICD-10-CM

## 2024-09-08 LAB
ALBUMIN SERPL BCP-MCNC: 4.8 G/DL (ref 3.4–5)
ALP SERPL-CCNC: 74 U/L (ref 33–110)
ALT SERPL W P-5'-P-CCNC: 11 U/L (ref 7–45)
ANION GAP SERPL CALC-SCNC: 15 MMOL/L (ref 10–20)
APPEARANCE UR: CLEAR
AST SERPL W P-5'-P-CCNC: 12 U/L (ref 9–39)
B-HCG SERPL-ACNC: <2 MIU/ML
BASOPHILS # BLD AUTO: 0.03 X10*3/UL (ref 0–0.1)
BASOPHILS NFR BLD AUTO: 0.4 %
BILIRUB DIRECT SERPL-MCNC: 0 MG/DL (ref 0–0.3)
BILIRUB SERPL-MCNC: 0.7 MG/DL (ref 0–1.2)
BILIRUB UR STRIP.AUTO-MCNC: NEGATIVE MG/DL
BUN SERPL-MCNC: 10 MG/DL (ref 6–23)
CALCIUM SERPL-MCNC: 9 MG/DL (ref 8.6–10.3)
CHLORIDE SERPL-SCNC: 106 MMOL/L (ref 98–107)
CO2 SERPL-SCNC: 20 MMOL/L (ref 21–32)
COLOR UR: YELLOW
CREAT SERPL-MCNC: 0.75 MG/DL (ref 0.5–1.05)
EGFRCR SERPLBLD CKD-EPI 2021: >90 ML/MIN/1.73M*2
EOSINOPHIL # BLD AUTO: 0.15 X10*3/UL (ref 0–0.7)
EOSINOPHIL NFR BLD AUTO: 2 %
ERYTHROCYTE [DISTWIDTH] IN BLOOD BY AUTOMATED COUNT: 13.2 % (ref 11.5–14.5)
GLUCOSE SERPL-MCNC: 106 MG/DL (ref 74–99)
GLUCOSE UR STRIP.AUTO-MCNC: NORMAL MG/DL
HCT VFR BLD AUTO: 42.1 % (ref 36–46)
HGB BLD-MCNC: 14.5 G/DL (ref 12–16)
IMM GRANULOCYTES # BLD AUTO: 0.03 X10*3/UL (ref 0–0.7)
IMM GRANULOCYTES NFR BLD AUTO: 0.4 % (ref 0–0.9)
KETONES UR STRIP.AUTO-MCNC: NEGATIVE MG/DL
LACTATE SERPL-SCNC: 0.7 MMOL/L (ref 0.4–2)
LEUKOCYTE ESTERASE UR QL STRIP.AUTO: NEGATIVE
LIPASE SERPL-CCNC: 30 U/L (ref 9–82)
LYMPHOCYTES # BLD AUTO: 1.22 X10*3/UL (ref 1.2–4.8)
LYMPHOCYTES NFR BLD AUTO: 16.6 %
MCH RBC QN AUTO: 31 PG (ref 26–34)
MCHC RBC AUTO-ENTMCNC: 34.4 G/DL (ref 32–36)
MCV RBC AUTO: 90 FL (ref 80–100)
MONOCYTES # BLD AUTO: 0.31 X10*3/UL (ref 0.1–1)
MONOCYTES NFR BLD AUTO: 4.2 %
NEUTROPHILS # BLD AUTO: 5.62 X10*3/UL (ref 1.2–7.7)
NEUTROPHILS NFR BLD AUTO: 76.4 %
NITRITE UR QL STRIP.AUTO: NEGATIVE
NRBC BLD-RTO: 0 /100 WBCS (ref 0–0)
PH UR STRIP.AUTO: 5 [PH]
PLATELET # BLD AUTO: 221 X10*3/UL (ref 150–450)
POTASSIUM SERPL-SCNC: 3.9 MMOL/L (ref 3.5–5.3)
PROT SERPL-MCNC: 7.8 G/DL (ref 6.4–8.2)
PROT UR STRIP.AUTO-MCNC: NEGATIVE MG/DL
RBC # BLD AUTO: 4.68 X10*6/UL (ref 4–5.2)
RBC # UR STRIP.AUTO: NEGATIVE /UL
SODIUM SERPL-SCNC: 137 MMOL/L (ref 136–145)
SP GR UR STRIP.AUTO: 1.03
UROBILINOGEN UR STRIP.AUTO-MCNC: NORMAL MG/DL
WBC # BLD AUTO: 7.4 X10*3/UL (ref 4.4–11.3)

## 2024-09-08 PROCEDURE — 96374 THER/PROPH/DIAG INJ IV PUSH: CPT | Mod: 59

## 2024-09-08 PROCEDURE — 2500000004 HC RX 250 GENERAL PHARMACY W/ HCPCS (ALT 636 FOR OP/ED)

## 2024-09-08 PROCEDURE — 84702 CHORIONIC GONADOTROPIN TEST: CPT

## 2024-09-08 PROCEDURE — 2550000001 HC RX 255 CONTRASTS

## 2024-09-08 PROCEDURE — 96361 HYDRATE IV INFUSION ADD-ON: CPT

## 2024-09-08 PROCEDURE — 83690 ASSAY OF LIPASE: CPT

## 2024-09-08 PROCEDURE — 83605 ASSAY OF LACTIC ACID: CPT

## 2024-09-08 PROCEDURE — 85025 COMPLETE CBC W/AUTO DIFF WBC: CPT

## 2024-09-08 PROCEDURE — 36415 COLL VENOUS BLD VENIPUNCTURE: CPT

## 2024-09-08 PROCEDURE — 81003 URINALYSIS AUTO W/O SCOPE: CPT

## 2024-09-08 PROCEDURE — 74177 CT ABD & PELVIS W/CONTRAST: CPT | Performed by: RADIOLOGY

## 2024-09-08 PROCEDURE — 74177 CT ABD & PELVIS W/CONTRAST: CPT

## 2024-09-08 PROCEDURE — 99284 EMERGENCY DEPT VISIT MOD MDM: CPT | Mod: 25

## 2024-09-08 PROCEDURE — 80053 COMPREHEN METABOLIC PANEL: CPT

## 2024-09-08 RX ORDER — KETOROLAC TROMETHAMINE 10 MG/1
10 TABLET, FILM COATED ORAL EVERY 6 HOURS PRN
Qty: 12 TABLET | Refills: 0 | Status: SHIPPED | OUTPATIENT
Start: 2024-09-08 | End: 2024-09-11

## 2024-09-08 RX ORDER — ONDANSETRON HYDROCHLORIDE 2 MG/ML
4 INJECTION, SOLUTION INTRAVENOUS ONCE
Status: DISCONTINUED | OUTPATIENT
Start: 2024-09-08 | End: 2024-09-08 | Stop reason: HOSPADM

## 2024-09-08 RX ORDER — KETOROLAC TROMETHAMINE 30 MG/ML
30 INJECTION, SOLUTION INTRAMUSCULAR; INTRAVENOUS ONCE
Status: COMPLETED | OUTPATIENT
Start: 2024-09-08 | End: 2024-09-08

## 2024-09-08 RX ORDER — DICYCLOMINE HYDROCHLORIDE 20 MG/1
20 TABLET ORAL 2 TIMES DAILY
Qty: 14 TABLET | Refills: 0 | Status: SHIPPED | OUTPATIENT
Start: 2024-09-08 | End: 2024-09-15

## 2024-09-08 RX ORDER — DICYCLOMINE HYDROCHLORIDE 10 MG/ML
20 INJECTION INTRAMUSCULAR ONCE
Status: DISCONTINUED | OUTPATIENT
Start: 2024-09-08 | End: 2024-09-08 | Stop reason: HOSPADM

## 2024-09-08 RX ORDER — KETOROLAC TROMETHAMINE 30 MG/ML
30 INJECTION, SOLUTION INTRAMUSCULAR; INTRAVENOUS ONCE
Status: DISCONTINUED | OUTPATIENT
Start: 2024-09-08 | End: 2024-09-08

## 2024-09-08 RX ORDER — MORPHINE SULFATE 4 MG/ML
4 INJECTION, SOLUTION INTRAMUSCULAR; INTRAVENOUS ONCE
Status: DISCONTINUED | OUTPATIENT
Start: 2024-09-08 | End: 2024-09-08

## 2024-09-08 ASSESSMENT — COLUMBIA-SUICIDE SEVERITY RATING SCALE - C-SSRS
1. IN THE PAST MONTH, HAVE YOU WISHED YOU WERE DEAD OR WISHED YOU COULD GO TO SLEEP AND NOT WAKE UP?: NO
6. HAVE YOU EVER DONE ANYTHING, STARTED TO DO ANYTHING, OR PREPARED TO DO ANYTHING TO END YOUR LIFE?: NO
2. HAVE YOU ACTUALLY HAD ANY THOUGHTS OF KILLING YOURSELF?: NO

## 2024-09-08 ASSESSMENT — PAIN DESCRIPTION - LOCATION
LOCATION: ABDOMEN
LOCATION: ABDOMEN

## 2024-09-08 ASSESSMENT — LIFESTYLE VARIABLES
EVER HAD A DRINK FIRST THING IN THE MORNING TO STEADY YOUR NERVES TO GET RID OF A HANGOVER: NO
HAVE YOU EVER FELT YOU SHOULD CUT DOWN ON YOUR DRINKING: NO
HAVE PEOPLE ANNOYED YOU BY CRITICIZING YOUR DRINKING: NO
EVER FELT BAD OR GUILTY ABOUT YOUR DRINKING: NO
TOTAL SCORE: 0

## 2024-09-08 ASSESSMENT — PAIN SCALES - GENERAL
PAINLEVEL_OUTOF10: 9
PAINLEVEL_OUTOF10: 9

## 2024-09-08 ASSESSMENT — PAIN DESCRIPTION - DESCRIPTORS: DESCRIPTORS: CRAMPING

## 2024-09-08 ASSESSMENT — PAIN DESCRIPTION - PAIN TYPE: TYPE: ACUTE PAIN

## 2024-09-08 ASSESSMENT — PAIN DESCRIPTION - ORIENTATION: ORIENTATION: RIGHT

## 2024-09-08 ASSESSMENT — PAIN - FUNCTIONAL ASSESSMENT: PAIN_FUNCTIONAL_ASSESSMENT: 0-10

## 2024-09-08 NOTE — ED PROVIDER NOTES
"HPI   Chief Complaint   Patient presents with    Abdominal Pain     R lower abdominal cramping into the groin,  denies any nvd, changes in urination        History provided by: Patient    Limitations to history: None    CC: Abdominal pain    HPI: 48-year-old female with a history of hypertension presents emergency department to be evaluated for abdominal pain.  Patient states abdominal pain started around 630 AM and has been constant.  She characterizes it as \"cramping and hans \"and localizes it to the right lower quadrant.  Nothing particular make the pain better or worse.  She has not had any pain like this before.  She has not taken anything for pain prior to arrival.  Denies fever and chills.  Denies urgency frequency and dysuria.  Denies blood in the urine or stool.  Denies diarrhea and constipation, nausea vomiting.  Denies chest pain or shortness of breath, denies history of heart or lung disease.  Denies fever and chills, cough, numbness, congestion, sore throat.  Denies any injury.  Denies all other systemic symptoms.    ROS: Negative unless mentioned in HPI    Social Hx: Denies tobacco, alcohol or drug use    Medical Hx: Allergy to ACE inhibitor and amoxicillin    Physical exam:    Constitutional: Patient is well-nourished and well-developed.  Appears to be uncomfortable but nontoxic in appearance.  Oriented to person, place, time, and situation.    HEENT: Head is normocephalic, atraumatic. Patient's airway is patent.  Tympanic membranes are clear bilaterally.  Nasal mucosa clear.  Mouth with normal mucosa.  Throat is not erythematous and there are no oropharyngeal exudates, uvula is midline.  No obvious facial deformities.    Eyes: Clear bilaterally.  Pupils are equal round and reactive to light and accommodation.  Extraocular movements intact.      Cardiac: Regular rate, regular rhythm.  Heart sounds S1, S2.  No murmurs, rubs, or gallops.  PMI nondisplaced.  No JVD.    Respiratory: Regular " respiratory rate and effort.  Breath sounds are clear and equal bilaterally, no adventitious lung sounds.  Patient is speaking in full sentences and is in no apparent respiratory distress. No use of accessory muscles.      Gastrointestinal: Right lower quadrant and suprapubic tenderness to palpation.  Abdomen is soft and nondistended.  There are no obvious deformities.  No rebound tenderness or guarding.  Bowel sounds are normal active.    Genitourinary: No CVA or flank tenderness.    Musculoskeletal: No reproducible tenderness.  No obvious skin or bony deformities.  Patient has equal range of motion in all extremities and no strength deficiencies.  No muscle or joint tenderness. No back or neck tenderness.  Capillary refill less than 3 seconds.  Strong peripheral pulses.  No sensory deficits.    Neurological: Patient is alert and oriented.  No focal deficits.  5/5 strength in all extremities.  Cranial nerves II through XII intact. GCS15.     Skin: Skin is normal color for race and is warm, dry, and intact.  No evidence of trauma.  No lesions, rashes, bruising, jaundice, or masses.    Psych: Appropriate mood and affect.  No apparent risk to self or others.    Heme/lymph: No adenopathy, lymphadenopathy, or splenomegaly    Physical exam is otherwise negative unless stated above or in history of present illness.              Patient History   Past Medical History:   Diagnosis Date    Abnormal hematological finding on  screening of mother 2014    Abnormal maternal serum screening test    Arthralgia of temporomandibular joint, unspecified side 2020    TMJ syndrome    Contact with and (suspected) exposure to covid-19 12/15/2021    Exposure to COVID-19 virus    Effusion, right hand 2019    Effusion of joint, hand, right    Encounter for immunization 10/28/2022    Encounter for immunization    Generalized anxiety disorder 2020    Anxiety as acute reaction to exceptional stress    Local  infection of the skin and subcutaneous tissue, unspecified 2022    Skin infection    Low back pain, unspecified 2019    Acute right-sided low back pain without sciatica    Other complications of procedures, not elsewhere classified, initial encounter 2020    Non-healing surgical wound    Other conditions influencing health status 2022    History of cough    Other conditions influencing health status 2014    Advanced maternal age in pregnancy    Pain in right finger(s) 2019    Pain of finger of right hand    Pain in unspecified hand 10/05/2021    Hand pain    Personal history of diseases of the skin and subcutaneous tissue 2020    History of cellulitis    Personal history of other (healed) physical injury and trauma 10/05/2021    History of sprain of wrist    Personal history of other diseases of the circulatory system     History of hypertension    Personal history of other diseases of the circulatory system 2019    History of abnormal electrocardiography    Personal history of other diseases of the respiratory system     History of bronchiolitis    Personal history of other diseases of the respiratory system 2021    History of acute bronchitis    Personal history of other diseases of the respiratory system 2022    History of acute sinusitis    Personal history of other diseases of the respiratory system 10/17/2022    History of upper respiratory infection    Personal history of other specified conditions 10/17/2022    History of chest pain    Personal history of other specified conditions 2022    History of nasal congestion    Sprain of unspecified part of unspecified wrist and hand, initial encounter 10/05/2021    Hand sprain     Past Surgical History:   Procedure Laterality Date    OTHER SURGICAL HISTORY  2019    Hand surgery    OTHER SURGICAL HISTORY  2019     section    OTHER SURGICAL HISTORY  2019    Facial surgery      No family history on file.  Social History     Tobacco Use    Smoking status: Never    Smokeless tobacco: Never   Vaping Use    Vaping status: Never Used   Substance Use Topics    Alcohol use: Never    Drug use: Never       Physical Exam   ED Triage Vitals [09/08/24 1720]   Temperature Heart Rate Respirations BP   36.7 °C (98.1 °F) 76 17 (!) 175/103      Pulse Ox Temp Source Heart Rate Source Patient Position   100 % Temporal Monitor --      BP Location FiO2 (%)     -- --       Physical Exam      ED Course & MDM   Diagnoses as of 09/08/24 2042   Right lower quadrant abdominal pain          Patient updated on plan for lab testing, IV insertion, radiology imaging, and medications to be administered while in the ER (if indicated). Patient updated on expected wait times for testing and results. Patient provided my name and told to ask any staff member for questions or concerns if they should arise. Electronic medical record reviewed.     MDM    Upon initial assessment, patient was healthy non-toxic appearing and in no apparent distress.     Patient presented to the emergency department with the chief complaint of abdominal pain. Right lower quadrant and suprapubic tenderness to palpation.  Abdomen is soft and nondistended.  There are no obvious deformities.  No rebound tenderness or guarding.  Bowel sounds are normal active.  No muffled heart sounds, JVD, or murmur.  Breath sounds are clear and equal bilaterally, 100% on room air.  On arrival to the emergency department, vital signs were within normal limits    Will give the patient IV normal saline as well as morphine for her discomfort.  Will obtain basic blood work, urinalysis, lactate and lipase, CT abdomen and pelvis.      Urinalysis reveals no urinary tract infection or hematuria.  Pregnancy test negative her lipase is 30.  Hepatic function panel and lactate are within normal limits.  BMP reveals no acute abnormalities and CBC shows no leukocytosis or anemia.   CT shows mild right retroperitoneal stranding Along the distal right ureter which could represent a subtle or recently passed stone.  Patient also has a redundant appearance of the proximal colon and hepatic steatosis and hepatomegaly.  Patient also has splenomegaly.    I did discuss differentials with the patient.  She feels well and feels comfortable being discharged.  She will be discharged with Bentyl and Toradol.  She will follow-up with her primary care provider.  All questions and concerns addressed.  Reasons to return to ER discussed.  Patient verbalized understanding and agreement with the treatment plan and they remained hemodynamically stable in the ER        This note was dictated using a speech recognition program.  While an attempt was made at proof-reading to minimize errors, minor errors in transcription may be present       No data recorded     Micaela Coma Scale Score: 15 (09/08/24 1722 : Vero Rodgers RN)                           Medical Decision Making      Procedure  Procedures     Ayaz Lambert PA-C  09/08/24 [id number]

## 2024-09-09 LAB — HOLD SPECIMEN: NORMAL

## 2024-09-29 DIAGNOSIS — H10.10 ACUTE ATOPIC CONJUNCTIVITIS, UNSPECIFIED EYE: ICD-10-CM

## 2024-09-30 RX ORDER — OLOPATADINE HYDROCHLORIDE 1 MG/ML
SOLUTION/ DROPS OPHTHALMIC
Qty: 15 ML | Refills: 1 | Status: SHIPPED | OUTPATIENT
Start: 2024-09-30

## 2024-10-11 ENCOUNTER — CLINICAL SUPPORT (OUTPATIENT)
Dept: PRIMARY CARE | Facility: CLINIC | Age: 49
End: 2024-10-11
Payer: COMMERCIAL

## 2024-10-11 DIAGNOSIS — Z23 NEED FOR VACCINATION: ICD-10-CM

## 2024-10-11 PROCEDURE — G0008 ADMIN INFLUENZA VIRUS VAC: HCPCS | Performed by: FAMILY MEDICINE

## 2024-10-11 PROCEDURE — 90656 IIV3 VACC NO PRSV 0.5 ML IM: CPT | Performed by: FAMILY MEDICINE

## 2024-10-11 NOTE — PROGRESS NOTES
Ashia Martinez is a 48 y.o. female here today for   Chief Complaint   Patient presents with    Flu Vaccine        HPI         Current Outpatient Medications:     albuterol (Ventolin HFA) 90 mcg/actuation inhaler, Inhale 2 puffs every 6 hours if needed for wheezing., Disp: 18 g, Rfl: 3    betamethasone dipropionate 0.05 % cream, Apply topically 2 times a day. (Patient not taking: Reported on 7/19/2024), Disp: 45 g, Rfl: 1    fluticasone (Flonase) 50 mcg/actuation nasal spray, Administer 1 spray into each nostril once daily., Disp: 16 g, Rfl: 1    folic acid (Folvite) 1 mg tablet, Take 1 tablet (1 mg) by mouth once daily., Disp: , Rfl:     levocetirizine (Xyzal) 5 mg tablet, Take 1 tablet (5 mg) by mouth once daily., Disp: 90 tablet, Rfl: 1    losartan (Cozaar) 50 mg tablet, TAKE 1 TABLET BY MOUTH ONCE DAILY, Disp: 90 tablet, Rfl: 1    methotrexate (Trexall) 2.5 mg tablet, Take 6 tablets (15 mg total) by mouth once a week., Disp: , Rfl:     montelukast (Singulair) 10 mg tablet, Take 1 tablet (10 mg) by mouth once daily., Disp: 90 tablet, Rfl: 1    olopatadine (Patanol) 0.1 % ophthalmic solution, INSTILL 1 DROP into the affected eye(s) TWICE DAILY AS DIRECTED, Disp: 15 mL, Rfl: 1    triamcinolone (Kenalog) 0.1 % cream, Apply topically 2 times a day. Use as needed for when rashes appear. Do not use for more than 2 weeks a time., Disp: 30 g, Rfl: 2    Patient Active Problem List   Diagnosis    Allergic conjunctivitis    Closed displaced fracture of distal phalanx of ring finger with routine healing    Primary hypertension    Rash    Old inferior wall myocardial infarction    Seasonal allergic rhinitis    Skin infection    Hypokalemia    Lip laceration    Class 2 severe obesity due to excess calories with serious comorbidity and body mass index (BMI) of 37.0 to 37.9 in adult    Syncope    Nasal congestion    Acute non-recurrent frontal sinusitis    Antibiotic-induced yeast infection    Infected insect bites of multiple  sites    Allergic contact dermatitis    Mild intermittent asthma without complication ([facility])    Dyspnea on exertion    Wheezes    Encounter for annual wellness exam in Medicare patient         No results found for this or any previous visit (from the past 672 hour(s)).     Objective    Visit Vitals    Visit Vitals  OB Status Having periods   Smoking Status Never       There is no height or weight on file to calculate BMI.     Physical Exam         Assessment & Plan  Need for vaccination    Orders:    Flu vaccine, trivalent, preservative free, age 6 months and greater (Fluarix/Fluzone/Flulaval)               Orders Placed This Encounter   Procedures    Flu vaccine, trivalent, preservative free, age 6 months and greater (Fluarix/Fluzone/Flulaval)        No orders of the defined types were placed in this encounter.

## 2024-10-16 DIAGNOSIS — I10 ESSENTIAL HYPERTENSION: ICD-10-CM

## 2024-10-16 RX ORDER — LOSARTAN POTASSIUM 50 MG/1
50 TABLET ORAL DAILY
Qty: 30 TABLET | Refills: 0 | Status: SHIPPED | OUTPATIENT
Start: 2024-10-16 | End: 2024-10-24 | Stop reason: SDUPTHER

## 2024-10-24 ENCOUNTER — APPOINTMENT (OUTPATIENT)
Dept: PRIMARY CARE | Facility: CLINIC | Age: 49
End: 2024-10-24
Payer: COMMERCIAL

## 2024-10-24 VITALS
DIASTOLIC BLOOD PRESSURE: 82 MMHG | BODY MASS INDEX: 37.61 KG/M2 | HEART RATE: 86 BPM | TEMPERATURE: 98.1 F | SYSTOLIC BLOOD PRESSURE: 130 MMHG | RESPIRATION RATE: 18 BRPM | HEIGHT: 66 IN | WEIGHT: 234 LBS

## 2024-10-24 DIAGNOSIS — J30.2 SEASONAL ALLERGIC RHINITIS, UNSPECIFIED TRIGGER: ICD-10-CM

## 2024-10-24 DIAGNOSIS — I10 PRIMARY HYPERTENSION: ICD-10-CM

## 2024-10-24 DIAGNOSIS — H10.13 ALLERGIC CONJUNCTIVITIS OF BOTH EYES: Primary | ICD-10-CM

## 2024-10-24 DIAGNOSIS — J45.20 MILD INTERMITTENT ASTHMA WITHOUT COMPLICATION (HHS-HCC): ICD-10-CM

## 2024-10-24 PROBLEM — R09.81 NASAL CONGESTION: Status: RESOLVED | Noted: 2023-09-11 | Resolved: 2024-10-24

## 2024-10-24 PROBLEM — R55 SYNCOPE: Status: RESOLVED | Noted: 2023-08-15 | Resolved: 2024-10-24

## 2024-10-24 PROBLEM — T07.XXXA INFECTED INSECT BITES OF MULTIPLE SITES: Status: RESOLVED | Noted: 2023-10-23 | Resolved: 2024-10-24

## 2024-10-24 PROBLEM — R06.2 WHEEZES: Status: RESOLVED | Noted: 2024-06-19 | Resolved: 2024-10-24

## 2024-10-24 PROBLEM — T36.95XA ANTIBIOTIC-INDUCED YEAST INFECTION: Status: RESOLVED | Noted: 2023-09-11 | Resolved: 2024-10-24

## 2024-10-24 PROBLEM — E87.6 HYPOKALEMIA: Status: RESOLVED | Noted: 2023-08-15 | Resolved: 2024-10-24

## 2024-10-24 PROBLEM — J01.10 ACUTE NON-RECURRENT FRONTAL SINUSITIS: Status: RESOLVED | Noted: 2023-09-11 | Resolved: 2024-10-24

## 2024-10-24 PROBLEM — R21 RASH: Status: RESOLVED | Noted: 2023-07-10 | Resolved: 2024-10-24

## 2024-10-24 PROBLEM — L08.9 SKIN INFECTION: Status: RESOLVED | Noted: 2023-07-10 | Resolved: 2024-10-24

## 2024-10-24 PROBLEM — L08.9 INFECTED INSECT BITES OF MULTIPLE SITES: Status: RESOLVED | Noted: 2023-10-23 | Resolved: 2024-10-24

## 2024-10-24 PROBLEM — B37.9 ANTIBIOTIC-INDUCED YEAST INFECTION: Status: RESOLVED | Noted: 2023-09-11 | Resolved: 2024-10-24
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
## 2024-10-24 PROBLEM — S01.511A LIP LACERATION: Status: RESOLVED | Noted: 2023-08-15 | Resolved: 2024-10-24

## 2024-10-24 PROCEDURE — 3075F SYST BP GE 130 - 139MM HG: CPT | Performed by: FAMILY MEDICINE

## 2024-10-24 PROCEDURE — 3079F DIAST BP 80-89 MM HG: CPT | Performed by: FAMILY MEDICINE

## 2024-10-24 PROCEDURE — 99214 OFFICE O/P EST MOD 30 MIN: CPT | Performed by: FAMILY MEDICINE

## 2024-10-24 PROCEDURE — 3008F BODY MASS INDEX DOCD: CPT | Performed by: FAMILY MEDICINE

## 2024-10-24 PROCEDURE — 1036F TOBACCO NON-USER: CPT | Performed by: FAMILY MEDICINE

## 2024-10-24 RX ORDER — FLUTICASONE PROPIONATE 50 MCG
1 SPRAY, SUSPENSION (ML) NASAL DAILY
Qty: 48 G | Refills: 1 | Status: SHIPPED | OUTPATIENT
Start: 2024-10-24

## 2024-10-24 RX ORDER — MONTELUKAST SODIUM 10 MG/1
10 TABLET ORAL DAILY
Qty: 90 TABLET | Refills: 1 | Status: SHIPPED | OUTPATIENT
Start: 2024-10-24

## 2024-10-24 RX ORDER — OLOPATADINE HYDROCHLORIDE 1 MG/ML
1 SOLUTION/ DROPS OPHTHALMIC 2 TIMES DAILY
Qty: 15 ML | Refills: 1 | Status: SHIPPED | OUTPATIENT
Start: 2024-10-24

## 2024-10-24 RX ORDER — LEVOCETIRIZINE DIHYDROCHLORIDE 5 MG/1
5 TABLET, FILM COATED ORAL DAILY
Qty: 90 TABLET | Refills: 1 | Status: SHIPPED | OUTPATIENT
Start: 2024-10-24

## 2024-10-24 RX ORDER — ALBUTEROL SULFATE 90 UG/1
2 INHALANT RESPIRATORY (INHALATION) EVERY 6 HOURS PRN
Qty: 54 G | Refills: 1 | Status: SHIPPED | OUTPATIENT
Start: 2024-10-24 | End: 2025-10-24

## 2024-10-24 RX ORDER — LOSARTAN POTASSIUM 50 MG/1
50 TABLET ORAL DAILY
Qty: 90 TABLET | Refills: 1 | Status: SHIPPED | OUTPATIENT
Start: 2024-10-24

## 2024-10-24 ASSESSMENT — ENCOUNTER SYMPTOMS: HYPERTENSION: 1

## 2024-10-24 NOTE — ASSESSMENT & PLAN NOTE
Condition well controlled.  No change in current treatment regimen.  Refill given of current medication.  Make a follow up appointment with me for recheck in 6 months.  Orders:    albuterol (Ventolin HFA) 90 mcg/actuation inhaler; Inhale 2 puffs every 6 hours if needed for wheezing.

## 2024-10-24 NOTE — ASSESSMENT & PLAN NOTE
Condition well controlled.  No change in current treatment regimen.  Refill given of current medication.  Make a follow up appointment with me for recheck in 6 months.  Orders:    olopatadine (Patanol) 0.1 % ophthalmic solution; Administer 1 drop into both eyes 2 times a day.

## 2024-10-24 NOTE — ASSESSMENT & PLAN NOTE
Condition well controlled.  No change in current treatment regimen.  Refill given of current medication.  Make a follow up appointment with me for recheck in 6 months.  Orders:    montelukast (Singulair) 10 mg tablet; Take 1 tablet (10 mg) by mouth once daily.    levocetirizine (Xyzal) 5 mg tablet; Take 1 tablet (5 mg) by mouth once daily.    fluticasone (Flonase) 50 mcg/actuation nasal spray; Administer 1 spray into each nostril once daily.

## 2024-10-24 NOTE — ASSESSMENT & PLAN NOTE
Condition well controlled.  No change in current treatment regimen.  Refill given of current medication.  Appropriate labs ordered or reviewed.  Make a follow up appointment with me for recheck in 6 months.  Orders:    losartan (Cozaar) 50 mg tablet; Take 1 tablet (50 mg) by mouth once daily.

## 2024-10-24 NOTE — PROGRESS NOTES
Ashia Martinez is a 49 y.o. female here today for   Chief Complaint   Patient presents with    Asthma    Hypertension        Asthma  This is a chronic problem. The current episode started more than 1 year ago. Her past medical history is significant for asthma.   Hypertension  This is a chronic problem. The current episode started more than 1 year ago.      HTN recheck -- Patient denies chest pain, SOB, edema, palpitations on review.  Taking medication correctly and denies any side effects.  Not checking at home.      Asthma recheck -- Asthma symptoms well controlled with current medications.  No significant flares.  No severe SOB, cough recently.  Patient wishes to continue the same medications.    Recheck Allergies -patient reports allergies are well controlled with current medications.  There are no side effects from the current medications.  Patient would like to continue with same treatment.  She saw allergist.  Is now on Symbicort BID and albuterol prn.      Needs RF of all meds.  Allergic conjunctivitis flared lately.            Current Outpatient Medications:     folic acid (Folvite) 1 mg tablet, Take 1 tablet (1 mg) by mouth once daily., Disp: , Rfl:     methotrexate (Trexall) 2.5 mg tablet, Take 6 tablets (15 mg total) by mouth once a week., Disp: , Rfl:     triamcinolone (Kenalog) 0.1 % cream, Apply topically 2 times a day. Use as needed for when rashes appear. Do not use for more than 2 weeks a time., Disp: 30 g, Rfl: 2    albuterol (Ventolin HFA) 90 mcg/actuation inhaler, Inhale 2 puffs every 6 hours if needed for wheezing., Disp: 54 g, Rfl: 1    fluticasone (Flonase) 50 mcg/actuation nasal spray, Administer 1 spray into each nostril once daily., Disp: 48 g, Rfl: 1    levocetirizine (Xyzal) 5 mg tablet, Take 1 tablet (5 mg) by mouth once daily., Disp: 90 tablet, Rfl: 1    losartan (Cozaar) 50 mg tablet, Take 1 tablet (50 mg) by mouth once daily., Disp: 90 tablet, Rfl: 1    montelukast (Singulair) 10 mg  "tablet, Take 1 tablet (10 mg) by mouth once daily., Disp: 90 tablet, Rfl: 1    olopatadine (Patanol) 0.1 % ophthalmic solution, Administer 1 drop into both eyes 2 times a day., Disp: 15 mL, Rfl: 1    Patient Active Problem List   Diagnosis    Allergic conjunctivitis    Closed displaced fracture of distal phalanx of ring finger with routine healing    Primary hypertension    Old inferior wall myocardial infarction    Seasonal allergic rhinitis    Class 2 severe obesity due to excess calories with serious comorbidity and body mass index (BMI) of 37.0 to 37.9 in adult    Allergic contact dermatitis    Mild intermittent asthma without complication ([facility])    Dyspnea on exertion    Encounter for annual wellness exam in Medicare patient         No results found for this or any previous visit (from the past 4 weeks).     Objective    Visit Vitals    Visit Vitals  /82   Pulse 86   Temp 36.7 °C (98.1 °F)   Resp 18   Ht 1.676 m (5' 6\")   Wt 106 kg (234 lb)   BMI 37.77 kg/m²   OB Status Having periods   Smoking Status Never   BSA 2.22 m²       Body mass index is 37.77 kg/m².     Physical Exam     General - Not in acute distress and cooperative.  Build & Nutrition - Well developed  Posture - Normal  Gait - Normal  Mental Status - alert and oriented x 3    Head - Normocephalic    Neck - Thyroid normal size    Eyes - Bilateral - Sclera clear and lids pink without edema or mass.      Skin - Warm and dry with no rashes on visible skin    Lungs - Clear to auscultation and normal breathing effort    Cardiovascular - RRR and no murmurs, rubs or thrill.    Peripheral Vascular - Bilateral - no edema present    Neuropsychiatric - normal mood and affect        Assessment & Plan  Seasonal allergic rhinitis, unspecified trigger  Condition well controlled.  No change in current treatment regimen.  Refill given of current medication.  Make a follow up appointment with me for recheck in 6 months.  Orders:    montelukast (Singulair) 10 " mg tablet; Take 1 tablet (10 mg) by mouth once daily.    levocetirizine (Xyzal) 5 mg tablet; Take 1 tablet (5 mg) by mouth once daily.    fluticasone (Flonase) 50 mcg/actuation nasal spray; Administer 1 spray into each nostril once daily.    Mild intermittent asthma without complication ([facility])  Condition well controlled.  No change in current treatment regimen.  Refill given of current medication.  Make a follow up appointment with me for recheck in 6 months.  Orders:    albuterol (Ventolin HFA) 90 mcg/actuation inhaler; Inhale 2 puffs every 6 hours if needed for wheezing.    Allergic conjunctivitis of both eyes  Condition well controlled.  No change in current treatment regimen.  Refill given of current medication.  Make a follow up appointment with me for recheck in 6 months.  Orders:    olopatadine (Patanol) 0.1 % ophthalmic solution; Administer 1 drop into both eyes 2 times a day.    Primary hypertension  Condition well controlled.  No change in current treatment regimen.  Refill given of current medication.  Appropriate labs ordered or reviewed.  Make a follow up appointment with me for recheck in 6 months.  Orders:    losartan (Cozaar) 50 mg tablet; Take 1 tablet (50 mg) by mouth once daily.         Orders Placed This Encounter      albuterol (Ventolin HFA) 90 mcg/actuation inhaler      fluticasone (Flonase) 50 mcg/actuation nasal spray      levocetirizine (Xyzal) 5 mg tablet      losartan (Cozaar) 50 mg tablet      montelukast (Singulair) 10 mg tablet      olopatadine (Patanol) 0.1 % ophthalmic solution       No orders of the defined types were placed in this encounter.       New Medications Ordered This Visit   Medications    losartan (Cozaar) 50 mg tablet     Sig: Take 1 tablet (50 mg) by mouth once daily.     Dispense:  90 tablet     Refill:  1    montelukast (Singulair) 10 mg tablet     Sig: Take 1 tablet (10 mg) by mouth once daily.     Dispense:  90 tablet     Refill:  1    olopatadine (Patanol) 0.1  % ophthalmic solution     Sig: Administer 1 drop into both eyes 2 times a day.     Dispense:  15 mL     Refill:  1    levocetirizine (Xyzal) 5 mg tablet     Sig: Take 1 tablet (5 mg) by mouth once daily.     Dispense:  90 tablet     Refill:  1    fluticasone (Flonase) 50 mcg/actuation nasal spray     Sig: Administer 1 spray into each nostril once daily.     Dispense:  48 g     Refill:  1    albuterol (Ventolin HFA) 90 mcg/actuation inhaler     Sig: Inhale 2 puffs every 6 hours if needed for wheezing.     Dispense:  54 g     Refill:  1

## 2024-11-13 ENCOUNTER — TELEPHONE (OUTPATIENT)
Dept: PRIMARY CARE | Facility: CLINIC | Age: 49
End: 2024-11-13
Payer: COMMERCIAL

## 2024-11-13 NOTE — TELEPHONE ENCOUNTER
Patient calling regards to her BP medication she said when she went to call it in for a refill at the pharmacy they told her they had nothing on file it but she was just and PCP did send it over please review. Pharmacy is [facility]

## 2025-02-26 ENCOUNTER — APPOINTMENT (OUTPATIENT)
Dept: PRIMARY CARE | Facility: CLINIC | Age: 50
End: 2025-02-26
Payer: COMMERCIAL

## 2025-02-26 ENCOUNTER — OFFICE VISIT (OUTPATIENT)
Dept: PRIMARY CARE | Facility: CLINIC | Age: 50
End: 2025-02-26
Payer: COMMERCIAL

## 2025-02-26 VITALS
TEMPERATURE: 97.9 F | SYSTOLIC BLOOD PRESSURE: 128 MMHG | DIASTOLIC BLOOD PRESSURE: 82 MMHG | RESPIRATION RATE: 20 BRPM | HEART RATE: 78 BPM | BODY MASS INDEX: 39.22 KG/M2 | WEIGHT: 243 LBS

## 2025-02-26 DIAGNOSIS — R05.8 POST-VIRAL COUGH SYNDROME: Primary | ICD-10-CM

## 2025-02-26 DIAGNOSIS — J45.20 MILD INTERMITTENT ASTHMA WITHOUT COMPLICATION (HHS-HCC): ICD-10-CM

## 2025-02-26 PROCEDURE — 3079F DIAST BP 80-89 MM HG: CPT | Performed by: FAMILY MEDICINE

## 2025-02-26 PROCEDURE — 99214 OFFICE O/P EST MOD 30 MIN: CPT | Performed by: FAMILY MEDICINE

## 2025-02-26 PROCEDURE — 3074F SYST BP LT 130 MM HG: CPT | Performed by: FAMILY MEDICINE

## 2025-02-26 PROCEDURE — 1036F TOBACCO NON-USER: CPT | Performed by: FAMILY MEDICINE

## 2025-02-26 RX ORDER — BENZONATATE 200 MG/1
200 CAPSULE ORAL 3 TIMES DAILY PRN
Qty: 42 CAPSULE | Refills: 0 | Status: SHIPPED | OUTPATIENT
Start: 2025-02-26 | End: 2025-03-28

## 2025-02-26 RX ORDER — BUDESONIDE AND FORMOTEROL FUMARATE DIHYDRATE 160; 4.5 UG/1; UG/1
AEROSOL RESPIRATORY (INHALATION)
COMMUNITY
Start: 2024-09-24

## 2025-02-26 RX ORDER — ONDANSETRON 4 MG/1
TABLET, ORALLY DISINTEGRATING ORAL
COMMUNITY
Start: 2025-02-16

## 2025-02-26 RX ORDER — PREDNISONE 10 MG/1
TABLET ORAL
Qty: 30 TABLET | Refills: 0 | Status: SHIPPED | OUTPATIENT
Start: 2025-02-26 | End: 2025-03-08

## 2025-02-26 ASSESSMENT — PATIENT HEALTH QUESTIONNAIRE - PHQ9
2. FEELING DOWN, DEPRESSED OR HOPELESS: NOT AT ALL
1. LITTLE INTEREST OR PLEASURE IN DOING THINGS: NOT AT ALL
SUM OF ALL RESPONSES TO PHQ9 QUESTIONS 1 AND 2: 0

## 2025-02-26 ASSESSMENT — ENCOUNTER SYMPTOMS
COUGH: 1
SORE THROAT: 1

## 2025-02-26 NOTE — PROGRESS NOTES
Ashia Martinez is a 49 y.o. female here today for   Chief Complaint   Patient presents with    URI        URI   This is a new problem. The current episode started 1 to 4 weeks ago. Associated symptoms include congestion, coughing and a sore throat.      Dxed with Flu A 2/15/25.  Now feeling better but still with wheeze and cough and chest congestion.  No fever.  Dry cough.  Feels like asthma is flared.  She says she has not had an asthma flare for a few years.  She does take montelukast daily.  She has been using more albuterol recently because of this flareup.  She denies any severe shortness of breath or stridor.      Current Outpatient Medications:     albuterol (Ventolin HFA) 90 mcg/actuation inhaler, Inhale 2 puffs every 6 hours if needed for wheezing., Disp: 54 g, Rfl: 1    fluticasone (Flonase) 50 mcg/actuation nasal spray, Administer 1 spray into each nostril once daily., Disp: 48 g, Rfl: 1    folic acid (Folvite) 1 mg tablet, Take 1 tablet (1 mg) by mouth once daily., Disp: , Rfl:     levocetirizine (Xyzal) 5 mg tablet, Take 1 tablet (5 mg) by mouth once daily., Disp: 90 tablet, Rfl: 1    losartan (Cozaar) 50 mg tablet, Take 1 tablet (50 mg) by mouth once daily., Disp: 90 tablet, Rfl: 1    methotrexate (Trexall) 2.5 mg tablet, Take 6 tablets (15 mg total) by mouth once a week., Disp: , Rfl:     montelukast (Singulair) 10 mg tablet, Take 1 tablet (10 mg) by mouth once daily., Disp: 90 tablet, Rfl: 1    olopatadine (Patanol) 0.1 % ophthalmic solution, Administer 1 drop into both eyes 2 times a day., Disp: 15 mL, Rfl: 1    triamcinolone (Kenalog) 0.1 % cream, Apply topically 2 times a day. Use as needed for when rashes appear. Do not use for more than 2 weeks a time., Disp: 30 g, Rfl: 2    Patient Active Problem List   Diagnosis    Allergic conjunctivitis    Closed displaced fracture of distal phalanx of ring finger with routine healing    Primary hypertension    Old inferior wall myocardial infarction     Seasonal allergic rhinitis    Class 2 severe obesity due to excess calories with serious comorbidity and body mass index (BMI) of 37.0 to 37.9 in adult    Allergic contact dermatitis    Mild intermittent asthma without complication ([facility])    Dyspnea on exertion    Encounter for annual wellness exam in Medicare patient         Objective    Visit Vitals    Visit Vitals  OB Status Having periods   Smoking Status Never       There is no height or weight on file to calculate BMI.     Physical Exam   General -  Cooperative, Not in acute distress.    Skin - Warm and dry with no rashes.    Eye - Bilateral - pupils equal and round, sclera clear and lids pink without edema or mass.  Sclera and conjunctiva - bilateral - Normal.    ENMT - Left -TM pearly grey with good light reflex and externa auditory canal pink and dry.              Right -TM pearly grey with good light relflex and external auditory canal pink and dry.    Oropharynx - moist and pink, tonsils normal, no erythema noted.    Nose  - Nasal mucosa - no purulent discharge.    Sinuses -- Frontal - no tenderness observed.  Maxillary - no tenderness observed.  Ethmoid - no tenderness observed.    Lungs -faint scattered wheezing and normal breathing effort.  Even and easy respiratory effort with no use of accessory muscles.  No rales or rhonchi.    Heart -- RRR and no murmurs, rubs or thrill    Lymphatic - Cervical nodes normal with no enlargement.     Assessment & Plan  Post-viral cough syndrome  I think the patient has a postviral cough secondary to inflammation caused by influenza.  There is no evidence of a continued infection but it has flared her asthma.  We are going to treat with a prednisone taper.  I will also give her Tessalon to use as needed.  Call or RTO if symptoms worsen or don't fully resolve.  Increase fluid intake.  Rest.  May use OTC symptomatic medications as needed at the appropriate dose.  Orders:    predniSONE (Deltasone) 10 mg tablet; Take 5  tablets (50 mg) by mouth once daily for 2 days, THEN 4 tablets (40 mg) once daily for 2 days, THEN 3 tablets (30 mg) once daily for 2 days, THEN 2 tablets (20 mg) once daily for 2 days, THEN 1 tablet (10 mg) once daily for 2 days.    benzonatate (Tessalon) 200 mg capsule; Take 1 capsule (200 mg) by mouth 3 times a day as needed for cough. Do not crush or chew.    Mild intermittent asthma without complication ([facility])  As above.  Orders:    predniSONE (Deltasone) 10 mg tablet; Take 5 tablets (50 mg) by mouth once daily for 2 days, THEN 4 tablets (40 mg) once daily for 2 days, THEN 3 tablets (30 mg) once daily for 2 days, THEN 2 tablets (20 mg) once daily for 2 days, THEN 1 tablet (10 mg) once daily for 2 days.    benzonatate (Tessalon) 200 mg capsule; Take 1 capsule (200 mg) by mouth 3 times a day as needed for cough. Do not crush or chew.               No orders of the defined types were placed in this encounter.       No orders of the defined types were placed in this encounter.

## 2025-02-26 NOTE — ASSESSMENT & PLAN NOTE
As above.  Orders:    predniSONE (Deltasone) 10 mg tablet; Take 5 tablets (50 mg) by mouth once daily for 2 days, THEN 4 tablets (40 mg) once daily for 2 days, THEN 3 tablets (30 mg) once daily for 2 days, THEN 2 tablets (20 mg) once daily for 2 days, THEN 1 tablet (10 mg) once daily for 2 days.    benzonatate (Tessalon) 200 mg capsule; Take 1 capsule (200 mg) by mouth 3 times a day as needed for cough. Do not crush or chew.

## 2025-03-22 DIAGNOSIS — J45.20 MILD INTERMITTENT ASTHMA WITHOUT COMPLICATION (HHS-HCC): ICD-10-CM

## 2025-03-24 RX ORDER — ALBUTEROL SULFATE 90 UG/1
INHALANT RESPIRATORY (INHALATION)
Qty: 54 G | Refills: 1 | Status: SHIPPED | OUTPATIENT
Start: 2025-03-24

## 2025-03-26 ENCOUNTER — OFFICE VISIT (OUTPATIENT)
Dept: PRIMARY CARE | Facility: CLINIC | Age: 50
End: 2025-03-26
Payer: COMMERCIAL

## 2025-03-26 VITALS
SYSTOLIC BLOOD PRESSURE: 124 MMHG | WEIGHT: 252 LBS | DIASTOLIC BLOOD PRESSURE: 82 MMHG | RESPIRATION RATE: 18 BRPM | TEMPERATURE: 98.5 F | HEIGHT: 66 IN | OXYGEN SATURATION: 98 % | BODY MASS INDEX: 40.5 KG/M2 | HEART RATE: 88 BPM

## 2025-03-26 DIAGNOSIS — J01.00 ACUTE NON-RECURRENT MAXILLARY SINUSITIS: Primary | ICD-10-CM

## 2025-03-26 PROBLEM — E66.01 CLASS 2 SEVERE OBESITY DUE TO EXCESS CALORIES WITH SERIOUS COMORBIDITY AND BODY MASS INDEX (BMI) OF 37.0 TO 37.9 IN ADULT: Status: RESOLVED | Noted: 2023-08-10 | Resolved: 2025-03-26

## 2025-03-26 PROBLEM — E66.812 CLASS 2 SEVERE OBESITY DUE TO EXCESS CALORIES WITH SERIOUS COMORBIDITY AND BODY MASS INDEX (BMI) OF 37.0 TO 37.9 IN ADULT: Status: RESOLVED | Noted: 2023-08-10 | Resolved: 2025-03-26

## 2025-03-26 PROCEDURE — 1036F TOBACCO NON-USER: CPT | Performed by: FAMILY MEDICINE

## 2025-03-26 PROCEDURE — 3008F BODY MASS INDEX DOCD: CPT | Performed by: FAMILY MEDICINE

## 2025-03-26 PROCEDURE — 3074F SYST BP LT 130 MM HG: CPT | Performed by: FAMILY MEDICINE

## 2025-03-26 PROCEDURE — 99214 OFFICE O/P EST MOD 30 MIN: CPT | Performed by: FAMILY MEDICINE

## 2025-03-26 PROCEDURE — G2211 COMPLEX E/M VISIT ADD ON: HCPCS | Performed by: FAMILY MEDICINE

## 2025-03-26 PROCEDURE — 3079F DIAST BP 80-89 MM HG: CPT | Performed by: FAMILY MEDICINE

## 2025-03-26 RX ORDER — BROMPHENIRAMINE MALEATE, PSEUDOEPHEDRINE HYDROCHLORIDE, AND DEXTROMETHORPHAN HYDROBROMIDE 2; 30; 10 MG/5ML; MG/5ML; MG/5ML
SYRUP ORAL
COMMUNITY
Start: 2025-03-25

## 2025-03-26 RX ORDER — CEFDINIR 300 MG/1
300 CAPSULE ORAL 2 TIMES DAILY
Qty: 20 CAPSULE | Refills: 0 | Status: SHIPPED | OUTPATIENT
Start: 2025-03-26 | End: 2025-04-05

## 2025-03-26 ASSESSMENT — ENCOUNTER SYMPTOMS
HEADACHES: 0
COUGH: 1
RHINORRHEA: 1

## 2025-03-26 NOTE — PROGRESS NOTES
Ashia Martinez is a 49 y.o. female here today for   Chief Complaint   Patient presents with    URI    Follow-up     UC 1 day ago         URI   This is a recurrent problem. The current episode started 1 to 4 weeks ago. Associated symptoms include congestion, coughing and rhinorrhea. Pertinent negatives include no ear pain or headaches.      Patient with URI symptoms for about 1 week.  She is having some cough and runny nose.  There is some chest and sinus congestion.  She had a negative home COVID test.  Her cough is nonproductive.  She has had some mild voice loss.  Getting some HA over sinuses too.   Throat sore.  Dxed with influenza A on 2/16/25.  Finished steroid from me from 2/26/25.  Was all better from influenza symptoms then this started.        Current Outpatient Medications:     albuterol 90 mcg/actuation inhaler, inhale 2 (TWO) puffs BY MOUTH EVERY 6 HOURS AS NEEDED FOR WHEEZING, Disp: 54 g, Rfl: 1    benzonatate (Tessalon) 200 mg capsule, Take 1 capsule (200 mg) by mouth 3 times a day as needed for cough. Do not crush or chew., Disp: 42 capsule, Rfl: 0    brompheniramine-pseudoeph-DM 2-30-10 mg/5 mL syrup, , Disp: , Rfl:     budesonide-formoteroL (Symbicort) 160-4.5 mcg/actuation inhaler, INHALE 2 PUFFS BY MOUTH TWICE DAILY FOR 30 DAYS, Disp: , Rfl:     fluticasone (Flonase) 50 mcg/actuation nasal spray, Administer 1 spray into each nostril once daily., Disp: 48 g, Rfl: 1    folic acid (Folvite) 1 mg tablet, Take 1 tablet (1 mg) by mouth once daily., Disp: , Rfl:     levocetirizine (Xyzal) 5 mg tablet, Take 1 tablet (5 mg) by mouth once daily., Disp: 90 tablet, Rfl: 1    losartan (Cozaar) 50 mg tablet, Take 1 tablet (50 mg) by mouth once daily., Disp: 90 tablet, Rfl: 1    methotrexate (Trexall) 2.5 mg tablet, Take 6 tablets (15 mg total) by mouth once a week., Disp: , Rfl:     montelukast (Singulair) 10 mg tablet, Take 1 tablet (10 mg) by mouth once daily., Disp: 90 tablet, Rfl: 1    olopatadine (Patanol)  "0.1 % ophthalmic solution, Administer 1 drop into both eyes 2 times a day., Disp: 15 mL, Rfl: 1    ondansetron ODT (Zofran-ODT) 4 mg disintegrating tablet, DISSOLVE 1 (ONE) TABLET UNDER THE TONGUE EVERY 4 TO 6 HOURS AS NEEDED FOR NAUSEA AND VOMITING, Disp: , Rfl:     triamcinolone (Kenalog) 0.1 % cream, Apply topically 2 times a day. Use as needed for when rashes appear. Do not use for more than 2 weeks a time., Disp: 30 g, Rfl: 2    cefdinir (Omnicef) 300 mg capsule, Take 1 capsule (300 mg) by mouth 2 times a day for 10 days., Disp: 20 capsule, Rfl: 0    Patient Active Problem List   Diagnosis    Allergic conjunctivitis    Closed displaced fracture of distal phalanx of ring finger with routine healing    Primary hypertension    Old inferior wall myocardial infarction    Seasonal allergic rhinitis    Allergic contact dermatitis    Mild intermittent asthma without complication ([facility])    Dyspnea on exertion    Encounter for annual wellness exam in Medicare patient         Objective    Visit Vitals    Visit Vitals  /82   Pulse 88   Temp 36.9 °C (98.5 °F)   Resp 18   Ht 1.676 m (5' 6\")   Wt 114 kg (252 lb)   SpO2 98%   BMI 40.67 kg/m²   OB Status Having periods   Smoking Status Never   BSA 2.3 m²       Body mass index is 40.67 kg/m².     Physical Exam     General -  Cooperative, Not in acute distress.    Skin - Warm and dry with no rashes.    Eye - Bilateral - pupils equal and round, sclera clear and lids pink without edema or mass.  Sclera and conjunctiva - bilateral - Normal.    ENMT - Left -TM pearly grey with good light reflex and externa auditory canal pink and dry.              Right -TM pearly grey with good light relflex and external auditory canal pink and dry.    Oropharynx - moist and pink, tonsils normal, no erythema noted.    Nose  - Nasal mucosa - no purulent discharge.    Sinuses -- Frontal -bilateral tenderness observed.  Maxillary -bilateral tenderness observed.  Ethmoid - no tenderness " observed.    Lungs - Clear to auscultation and normal breathing effort.  Even and easy respiratory effort with no use of accessory muscles.    Heart -- RRR and no murmurs, rubs or thrill    Lymphatic - Cervical nodes normal with no enlargement.    Assessment & Plan  Acute non-recurrent maxillary sinusitis  We will treat with cefdinir x 10 days.  Call or RTO if symptoms worsen or don't fully resolve.  Increase fluid intake.  Rest.  May use OTC symptomatic medications as needed at the appropriate dose.  Orders:    cefdinir (Omnicef) 300 mg capsule; Take 1 capsule (300 mg) by mouth 2 times a day for 10 days.         Orders Placed This Encounter      cefdinir (Omnicef) 300 mg capsule       No orders of the defined types were placed in this encounter.       New Medications Ordered This Visit   Medications    brompheniramine-pseudoeph-DM 2-30-10 mg/5 mL syrup    cefdinir (Omnicef) 300 mg capsule     Sig: Take 1 capsule (300 mg) by mouth 2 times a day for 10 days.     Dispense:  20 capsule     Refill:  0

## 2025-03-31 DIAGNOSIS — B37.9 YEAST INFECTION: ICD-10-CM

## 2025-03-31 RX ORDER — FLUCONAZOLE 150 MG/1
150 TABLET ORAL ONCE
Qty: 1 TABLET | Refills: 0 | Status: SHIPPED | OUTPATIENT
Start: 2025-03-31 | End: 2025-03-31

## 2025-03-31 NOTE — TELEPHONE ENCOUNTER
Local pharmacy [facility] drug [facility]      Pt was given an antibiotic and now has a yeast infection, can you call in a rx for diflucan?  Please call pt if approved/denied.

## 2025-04-26 DIAGNOSIS — J30.2 SEASONAL ALLERGIC RHINITIS, UNSPECIFIED TRIGGER: ICD-10-CM

## 2025-04-26 DIAGNOSIS — I10 PRIMARY HYPERTENSION: ICD-10-CM

## 2025-04-28 ENCOUNTER — APPOINTMENT (OUTPATIENT)
Dept: PRIMARY CARE | Facility: CLINIC | Age: 50
End: 2025-04-28
Payer: COMMERCIAL

## 2025-04-28 RX ORDER — LEVOCETIRIZINE DIHYDROCHLORIDE 5 MG/1
5 TABLET, FILM COATED ORAL DAILY
Qty: 30 TABLET | Refills: 0 | Status: SHIPPED | OUTPATIENT
Start: 2025-04-28 | End: 2025-04-30 | Stop reason: SDUPTHER

## 2025-04-28 RX ORDER — LOSARTAN POTASSIUM 50 MG/1
50 TABLET ORAL DAILY
Qty: 30 TABLET | Refills: 0 | Status: SHIPPED | OUTPATIENT
Start: 2025-04-28 | End: 2025-04-30 | Stop reason: SDUPTHER

## 2025-04-28 RX ORDER — MONTELUKAST SODIUM 10 MG/1
10 TABLET ORAL DAILY
Qty: 30 TABLET | Refills: 0 | Status: SHIPPED | OUTPATIENT
Start: 2025-04-28 | End: 2025-04-30 | Stop reason: SDUPTHER

## 2025-04-30 ENCOUNTER — APPOINTMENT (OUTPATIENT)
Dept: PRIMARY CARE | Facility: CLINIC | Age: 50
End: 2025-04-30
Payer: COMMERCIAL

## 2025-04-30 VITALS
WEIGHT: 249 LBS | DIASTOLIC BLOOD PRESSURE: 88 MMHG | SYSTOLIC BLOOD PRESSURE: 144 MMHG | BODY MASS INDEX: 40.02 KG/M2 | HEART RATE: 125 BPM | HEIGHT: 66 IN | RESPIRATION RATE: 16 BRPM | TEMPERATURE: 97.9 F

## 2025-04-30 DIAGNOSIS — J45.20 MILD INTERMITTENT ASTHMA WITHOUT COMPLICATION (HHS-HCC): ICD-10-CM

## 2025-04-30 DIAGNOSIS — I10 PRIMARY HYPERTENSION: ICD-10-CM

## 2025-04-30 DIAGNOSIS — H10.13 ALLERGIC CONJUNCTIVITIS OF BOTH EYES: ICD-10-CM

## 2025-04-30 DIAGNOSIS — J30.2 SEASONAL ALLERGIC RHINITIS, UNSPECIFIED TRIGGER: ICD-10-CM

## 2025-04-30 DIAGNOSIS — Z12.31 BREAST CANCER SCREENING BY MAMMOGRAM: Primary | ICD-10-CM

## 2025-04-30 PROCEDURE — G2211 COMPLEX E/M VISIT ADD ON: HCPCS | Performed by: FAMILY MEDICINE

## 2025-04-30 PROCEDURE — 3008F BODY MASS INDEX DOCD: CPT | Performed by: FAMILY MEDICINE

## 2025-04-30 PROCEDURE — 3079F DIAST BP 80-89 MM HG: CPT | Performed by: FAMILY MEDICINE

## 2025-04-30 PROCEDURE — 3077F SYST BP >= 140 MM HG: CPT | Performed by: FAMILY MEDICINE

## 2025-04-30 PROCEDURE — 99214 OFFICE O/P EST MOD 30 MIN: CPT | Performed by: FAMILY MEDICINE

## 2025-04-30 RX ORDER — FLUTICASONE PROPIONATE 50 MCG
1 SPRAY, SUSPENSION (ML) NASAL DAILY
Qty: 48 G | Refills: 1 | Status: SHIPPED | OUTPATIENT
Start: 2025-04-30

## 2025-04-30 RX ORDER — MONTELUKAST SODIUM 10 MG/1
10 TABLET ORAL DAILY
Qty: 90 TABLET | Refills: 1 | Status: SHIPPED | OUTPATIENT
Start: 2025-04-30

## 2025-04-30 RX ORDER — BUDESONIDE AND FORMOTEROL FUMARATE DIHYDRATE 160; 4.5 UG/1; UG/1
2 AEROSOL RESPIRATORY (INHALATION)
Qty: 10.2 G | Refills: 6 | Status: SHIPPED | OUTPATIENT
Start: 2025-04-30

## 2025-04-30 RX ORDER — LEVOCETIRIZINE DIHYDROCHLORIDE 5 MG/1
5 TABLET, FILM COATED ORAL DAILY
Qty: 90 TABLET | Refills: 1 | Status: SHIPPED | OUTPATIENT
Start: 2025-04-30

## 2025-04-30 RX ORDER — ALBUTEROL SULFATE 90 UG/1
2 INHALANT RESPIRATORY (INHALATION) EVERY 6 HOURS PRN
Qty: 54 G | Refills: 1 | Status: SHIPPED | OUTPATIENT
Start: 2025-04-30

## 2025-04-30 RX ORDER — OLOPATADINE HYDROCHLORIDE 1 MG/ML
1 SOLUTION OPHTHALMIC 2 TIMES DAILY
Qty: 15 ML | Refills: 1 | Status: SHIPPED | OUTPATIENT
Start: 2025-04-30 | End: 2026-04-30

## 2025-04-30 RX ORDER — LOSARTAN POTASSIUM 50 MG/1
50 TABLET ORAL DAILY
Qty: 90 TABLET | Refills: 1 | Status: SHIPPED | OUTPATIENT
Start: 2025-04-30

## 2025-04-30 NOTE — ASSESSMENT & PLAN NOTE
Condition well controlled.  No change in current treatment regimen.  Refill given of current medication.  Make a follow up appointment with me for recheck in 6 months.  Orders:    fluticasone (Flonase) 50 mcg/actuation nasal spray; Administer 1 spray into each nostril once daily.    levocetirizine (Xyzal) 5 mg tablet; Take 1 tablet (5 mg) by mouth once daily.    montelukast (Singulair) 10 mg tablet; Take 1 tablet (10 mg) by mouth once daily.

## 2025-04-30 NOTE — PROGRESS NOTES
"Ashia Martinez is a 49 y.o. female here today for   Chief Complaint   Patient presents with    Hypertension    Asthma    Allergies        HPI     HTN recheck -- Patient denies chest pain, SOB, edema, palpitations on review.  Taking medication correctly and denies any side effects.      Asthma recheck -- Asthma symptoms well controlled with current medications.  No significant flares.  No severe SOB, cough recently.  Patient wishes to continue the same medications.   Uses symbicort BID but only when she needs it.  She uses albuterol very rarely and has had no ER visits or severe flares for many years.      Recheck Allergies -patient reports allergies are well controlled with current medications.  There are no side effects from the current medications.  Patient would like to continue with same treatment.  She also gets severe allergic conjunctivitis and was previously prescribed Patanol but she says the pharmacist told her that it is no longer covered by her insurance.  She asked if I can try to send it in again.    She is due for mammogram.    Current Medications[1]    Problem List[2]      Objective    Visit Vitals    Visit Vitals  /88   Pulse (!) 125   Temp 36.6 °C (97.9 °F)   Resp 16   Ht 1.676 m (5' 6\")   Wt 113 kg (249 lb)   BMI 40.19 kg/m²   OB Status Having periods   Smoking Status Never   BSA 2.29 m²       Body mass index is 40.19 kg/m².     Physical Exam     General - Not in acute distress and cooperative.  Build & Nutrition - Well developed  Posture - Normal  Gait - Normal  Mental Status - alert and oriented x 3    Head - Normocephalic    Neck - Thyroid normal size    Eyes - Bilateral - Sclera clear and lids pink without edema or mass.      Skin - Warm and dry with no rashes on visible skin    Lungs - Clear to auscultation and normal breathing effort    Cardiovascular - RRR and no murmurs, rubs or thrill.    Peripheral Vascular - Bilateral - no edema present    Neuropsychiatric - normal mood and " affect    Breasts-no lumps or masses or nipple discharge.    Assessment & Plan  Mild intermittent asthma without complication ([facility])  Condition well controlled.  No change in current treatment regimen.  Refill given of current medication.  Make a follow up appointment with me for recheck in 6 months.  Orders:    albuterol 90 mcg/actuation inhaler; Inhale 2 puffs every 6 hours if needed for wheezing.    budesonide-formoterol (Symbicort) 160-4.5 mcg/actuation inhaler; Inhale 2 puffs 2 times a day. Rinse mouth with water after use to reduce aftertaste and incidence of candidiasis. Do not swallow.    Seasonal allergic rhinitis, unspecified trigger  Condition well controlled.  No change in current treatment regimen.  Refill given of current medication.  Make a follow up appointment with me for recheck in 6 months.  Orders:    fluticasone (Flonase) 50 mcg/actuation nasal spray; Administer 1 spray into each nostril once daily.    levocetirizine (Xyzal) 5 mg tablet; Take 1 tablet (5 mg) by mouth once daily.    montelukast (Singulair) 10 mg tablet; Take 1 tablet (10 mg) by mouth once daily.    Primary hypertension  Condition well controlled.  No change in current treatment regimen.  Refill given of current medication.  Appropriate labs ordered or reviewed.  Make a follow up appointment with me for recheck in 6 months.  Orders:    losartan (Cozaar) 50 mg tablet; Take 1 tablet (50 mg) by mouth once daily.    Comprehensive Metabolic Panel; Future    CBC; Future    Lipid Panel; Future    Thyroid Stimulating Hormone; Future    Breast cancer screening by mammogram  A mammogram was ordered.  Orders:    BI mammo bilateral screening tomosynthesis; Future    Allergic conjunctivitis of both eyes  Condition well controlled.  No change in current treatment regimen.  Refill given of current medication.  Make a follow up appointment with me for recheck in 6 months.  Orders:    olopatadine (Patanol) 0.1 % ophthalmic solution; Administer  1 drop into both eyes 2 times a day.         Orders Placed This Encounter      albuterol 90 mcg/actuation inhaler      budesonide-formoterol (Symbicort) 160-4.5 mcg/actuation inhaler      fluticasone (Flonase) 50 mcg/actuation nasal spray      levocetirizine (Xyzal) 5 mg tablet      losartan (Cozaar) 50 mg tablet      montelukast (Singulair) 10 mg tablet      olopatadine (Patanol) 0.1 % ophthalmic solution       Orders Placed This Encounter   Procedures    BI mammo bilateral screening tomosynthesis    Comprehensive Metabolic Panel    CBC    Lipid Panel    Thyroid Stimulating Hormone        New Medications Ordered This Visit   Medications    albuterol 90 mcg/actuation inhaler     Sig: Inhale 2 puffs every 6 hours if needed for wheezing.     Dispense:  54 g     Refill:  1     This prescription was filled on 3/7/2025. Any refills authorized will be placed on file.    budesonide-formoterol (Symbicort) 160-4.5 mcg/actuation inhaler     Sig: Inhale 2 puffs 2 times a day. Rinse mouth with water after use to reduce aftertaste and incidence of candidiasis. Do not swallow.     Dispense:  10.2 g     Refill:  6    fluticasone (Flonase) 50 mcg/actuation nasal spray     Sig: Administer 1 spray into each nostril once daily.     Dispense:  48 g     Refill:  1    levocetirizine (Xyzal) 5 mg tablet     Sig: Take 1 tablet (5 mg) by mouth once daily.     Dispense:  90 tablet     Refill:  1    losartan (Cozaar) 50 mg tablet     Sig: Take 1 tablet (50 mg) by mouth once daily.     Dispense:  90 tablet     Refill:  1    montelukast (Singulair) 10 mg tablet     Sig: Take 1 tablet (10 mg) by mouth once daily.     Dispense:  90 tablet     Refill:  1    olopatadine (Patanol) 0.1 % ophthalmic solution     Sig: Administer 1 drop into both eyes 2 times a day.     Dispense:  15 mL     Refill:  1               [1]   Current Outpatient Medications:     triamcinolone (Kenalog) 0.1 % cream, Apply topically 2 times a day. Use as needed for when rashes  appear. Do not use for more than 2 weeks a time., Disp: 30 g, Rfl: 2    albuterol 90 mcg/actuation inhaler, Inhale 2 puffs every 6 hours if needed for wheezing., Disp: 54 g, Rfl: 1    brompheniramine-pseudoeph-DM 2-30-10 mg/5 mL syrup, , Disp: , Rfl:     budesonide-formoterol (Symbicort) 160-4.5 mcg/actuation inhaler, Inhale 2 puffs 2 times a day. Rinse mouth with water after use to reduce aftertaste and incidence of candidiasis. Do not swallow., Disp: 10.2 g, Rfl: 6    fluticasone (Flonase) 50 mcg/actuation nasal spray, Administer 1 spray into each nostril once daily., Disp: 48 g, Rfl: 1    folic acid (Folvite) 1 mg tablet, Take 1 tablet (1 mg) by mouth once daily., Disp: , Rfl:     levocetirizine (Xyzal) 5 mg tablet, Take 1 tablet (5 mg) by mouth once daily., Disp: 90 tablet, Rfl: 1    losartan (Cozaar) 50 mg tablet, Take 1 tablet (50 mg) by mouth once daily., Disp: 90 tablet, Rfl: 1    methotrexate (Trexall) 2.5 mg tablet, Take 6 tablets (15 mg total) by mouth once a week., Disp: , Rfl:     montelukast (Singulair) 10 mg tablet, Take 1 tablet (10 mg) by mouth once daily., Disp: 90 tablet, Rfl: 1    olopatadine (Patanol) 0.1 % ophthalmic solution, Administer 1 drop into both eyes 2 times a day., Disp: 15 mL, Rfl: 1    ondansetron ODT (Zofran-ODT) 4 mg disintegrating tablet, DISSOLVE 1 (ONE) TABLET UNDER THE TONGUE EVERY 4 TO 6 HOURS AS NEEDED FOR NAUSEA AND VOMITING, Disp: , Rfl:   [2]   Patient Active Problem List  Diagnosis    Allergic conjunctivitis    Closed displaced fracture of distal phalanx of ring finger with routine healing    Primary hypertension    Old inferior wall myocardial infarction    Seasonal allergic rhinitis    Allergic contact dermatitis    Mild intermittent asthma without complication ([facility])    Dyspnea on exertion    Encounter for annual wellness exam in Medicare patient

## 2025-04-30 NOTE — ASSESSMENT & PLAN NOTE
Condition well controlled.  No change in current treatment regimen.  Refill given of current medication.  Appropriate labs ordered or reviewed.  Make a follow up appointment with me for recheck in 6 months.  Orders:    losartan (Cozaar) 50 mg tablet; Take 1 tablet (50 mg) by mouth once daily.    Comprehensive Metabolic Panel; Future    CBC; Future    Lipid Panel; Future    Thyroid Stimulating Hormone; Future

## 2025-04-30 NOTE — ASSESSMENT & PLAN NOTE
Condition well controlled.  No change in current treatment regimen.  Refill given of current medication.  Make a follow up appointment with me for recheck in 6 months.  Orders:    albuterol 90 mcg/actuation inhaler; Inhale 2 puffs every 6 hours if needed for wheezing.    budesonide-formoterol (Symbicort) 160-4.5 mcg/actuation inhaler; Inhale 2 puffs 2 times a day. Rinse mouth with water after use to reduce aftertaste and incidence of candidiasis. Do not swallow.

## 2025-05-10 ENCOUNTER — APPOINTMENT (OUTPATIENT)
Dept: RADIOLOGY | Facility: HOSPITAL | Age: 50
End: 2025-05-10
Payer: COMMERCIAL

## 2025-05-18 LAB
ALBUMIN SERPL-MCNC: 4.3 G/DL (ref 3.6–5.1)
ALP SERPL-CCNC: 60 U/L (ref 31–125)
ALT SERPL-CCNC: 16 U/L (ref 6–29)
ANION GAP SERPL CALCULATED.4IONS-SCNC: 7 MMOL/L (CALC) (ref 7–17)
AST SERPL-CCNC: 14 U/L (ref 10–35)
BILIRUB SERPL-MCNC: 0.5 MG/DL (ref 0.2–1.2)
BUN SERPL-MCNC: 15 MG/DL (ref 7–25)
CALCIUM SERPL-MCNC: 9 MG/DL (ref 8.6–10.2)
CHLORIDE SERPL-SCNC: 108 MMOL/L (ref 98–110)
CHOLEST SERPL-MCNC: 148 MG/DL
CHOLEST/HDLC SERPL: 3.5 (CALC)
CO2 SERPL-SCNC: 24 MMOL/L (ref 20–32)
CREAT SERPL-MCNC: 0.77 MG/DL (ref 0.5–0.99)
EGFRCR SERPLBLD CKD-EPI 2021: 95 ML/MIN/1.73M2
ERYTHROCYTE [DISTWIDTH] IN BLOOD BY AUTOMATED COUNT: 13.2 % (ref 11–15)
GLUCOSE SERPL-MCNC: 122 MG/DL (ref 65–99)
HCT VFR BLD AUTO: 40.4 % (ref 35–45)
HDLC SERPL-MCNC: 42 MG/DL
HGB BLD-MCNC: 13.8 G/DL (ref 11.7–15.5)
LDLC SERPL CALC-MCNC: 91 MG/DL (CALC)
MCH RBC QN AUTO: 31.3 PG (ref 27–33)
MCHC RBC AUTO-ENTMCNC: 34.2 G/DL (ref 32–36)
MCV RBC AUTO: 91.6 FL (ref 80–100)
NONHDLC SERPL-MCNC: 106 MG/DL (CALC)
PLATELET # BLD AUTO: 215 THOUSAND/UL (ref 140–400)
PMV BLD REES-ECKER: 9.8 FL (ref 7.5–12.5)
POTASSIUM SERPL-SCNC: 4.6 MMOL/L (ref 3.5–5.3)
PROT SERPL-MCNC: 6.8 G/DL (ref 6.1–8.1)
RBC # BLD AUTO: 4.41 MILLION/UL (ref 3.8–5.1)
SODIUM SERPL-SCNC: 139 MMOL/L (ref 135–146)
TRIGL SERPL-MCNC: 67 MG/DL
TSH SERPL-ACNC: 1.81 MIU/L
WBC # BLD AUTO: 4.9 THOUSAND/UL (ref 3.8–10.8)

## 2025-05-19 ENCOUNTER — TELEPHONE (OUTPATIENT)
Dept: PRIMARY CARE | Facility: CLINIC | Age: 50
End: 2025-05-19
Payer: COMMERCIAL

## 2025-05-19 DIAGNOSIS — R73.09 ELEVATED GLUCOSE: Primary | ICD-10-CM

## 2025-05-19 NOTE — TELEPHONE ENCOUNTER
----- Message from Dhiraj Alatorre sent at 5/19/2025  8:07 AM EDT -----  Please inform the patient that her glucose was again elevated at 122.  She has had other glucose readings elevated and we need to make sure she does not have diabetes.  Please get a repeat fasting   BMP and A1c and we will call her with results.  The rest of her labs were all essentially normal.  ----- Message -----  From: [facility] Results In  Sent: 5/18/2025   2:56 AM EDT  To: Dhiraj Alatorre MD

## 2025-05-21 LAB
ANION GAP SERPL CALCULATED.4IONS-SCNC: 8 MMOL/L (CALC) (ref 7–17)
BUN SERPL-MCNC: 13 MG/DL (ref 7–25)
BUN/CREAT SERPL: ABNORMAL (CALC) (ref 6–22)
CALCIUM SERPL-MCNC: 8.8 MG/DL (ref 8.6–10.2)
CHLORIDE SERPL-SCNC: 105 MMOL/L (ref 98–110)
CO2 SERPL-SCNC: 26 MMOL/L (ref 20–32)
CREAT SERPL-MCNC: 0.7 MG/DL (ref 0.5–0.99)
EGFRCR SERPLBLD CKD-EPI 2021: 107 ML/MIN/1.73M2
EST. AVERAGE GLUCOSE BLD GHB EST-MCNC: 120 MG/DL
EST. AVERAGE GLUCOSE BLD GHB EST-SCNC: 6.6 MMOL/L
GLUCOSE SERPL-MCNC: 122 MG/DL (ref 65–99)
HBA1C MFR BLD: 5.8 %
POTASSIUM SERPL-SCNC: 5.1 MMOL/L (ref 3.5–5.3)
SODIUM SERPL-SCNC: 139 MMOL/L (ref 135–146)

## 2025-05-21 NOTE — RESULT ENCOUNTER NOTE
Patient notified   
Please inform the patient that her A1c was slightly elevated.  Her A1c was 5.8 and normally it should be less than 5.7.  This means that her glucose has been running chronically a little high.  This classifies her as having prediabetes.  I would like her to make an appointment to see me in the next 2 to 4 weeks to discuss this further and discuss possible treatment options.  Please add prediabetes to her diagnosis list.  
0

## 2025-05-24 ENCOUNTER — HOSPITAL ENCOUNTER (OUTPATIENT)
Dept: RADIOLOGY | Facility: HOSPITAL | Age: 50
Discharge: HOME | End: 2025-05-24
Payer: COMMERCIAL

## 2025-05-24 VITALS — WEIGHT: 249 LBS | HEIGHT: 66 IN | BODY MASS INDEX: 40.02 KG/M2

## 2025-05-24 DIAGNOSIS — Z12.31 BREAST CANCER SCREENING BY MAMMOGRAM: ICD-10-CM

## 2025-05-24 PROCEDURE — 77067 SCR MAMMO BI INCL CAD: CPT | Performed by: STUDENT IN AN ORGANIZED HEALTH CARE EDUCATION/TRAINING PROGRAM

## 2025-05-24 PROCEDURE — 77063 BREAST TOMOSYNTHESIS BI: CPT | Performed by: STUDENT IN AN ORGANIZED HEALTH CARE EDUCATION/TRAINING PROGRAM

## 2025-05-24 PROCEDURE — 77067 SCR MAMMO BI INCL CAD: CPT

## 2025-06-17 ENCOUNTER — APPOINTMENT (OUTPATIENT)
Dept: PRIMARY CARE | Facility: CLINIC | Age: 50
End: 2025-06-17
Payer: COMMERCIAL

## 2025-06-17 VITALS
RESPIRATION RATE: 16 BRPM | TEMPERATURE: 97.3 F | SYSTOLIC BLOOD PRESSURE: 136 MMHG | DIASTOLIC BLOOD PRESSURE: 80 MMHG | BODY MASS INDEX: 40.18 KG/M2 | WEIGHT: 250 LBS | HEART RATE: 80 BPM | HEIGHT: 66 IN

## 2025-06-17 DIAGNOSIS — R73.03 PREDIABETES: Primary | ICD-10-CM

## 2025-06-17 DIAGNOSIS — Z00.00 ROUTINE GENERAL MEDICAL EXAMINATION AT HEALTH CARE FACILITY: ICD-10-CM

## 2025-06-17 PROCEDURE — 3075F SYST BP GE 130 - 139MM HG: CPT | Performed by: FAMILY MEDICINE

## 2025-06-17 PROCEDURE — 1036F TOBACCO NON-USER: CPT | Performed by: FAMILY MEDICINE

## 2025-06-17 PROCEDURE — 99396 PREV VISIT EST AGE 40-64: CPT | Performed by: FAMILY MEDICINE

## 2025-06-17 PROCEDURE — 3008F BODY MASS INDEX DOCD: CPT | Performed by: FAMILY MEDICINE

## 2025-06-17 PROCEDURE — 3079F DIAST BP 80-89 MM HG: CPT | Performed by: FAMILY MEDICINE

## 2025-06-17 PROCEDURE — 99212 OFFICE O/P EST SF 10 MIN: CPT | Performed by: FAMILY MEDICINE

## 2025-06-17 ASSESSMENT — ACTIVITIES OF DAILY LIVING (ADL)
BATHING: INDEPENDENT
DRESSING: INDEPENDENT

## 2025-06-17 ASSESSMENT — PATIENT HEALTH QUESTIONNAIRE - PHQ9
SUM OF ALL RESPONSES TO PHQ9 QUESTIONS 1 AND 2: 0
1. LITTLE INTEREST OR PLEASURE IN DOING THINGS: NOT AT ALL
2. FEELING DOWN, DEPRESSED OR HOPELESS: NOT AT ALL

## 2025-06-17 NOTE — PROGRESS NOTES
Ashia Martinez is a 49 y.o. female here today a periodic health exam.  I reviewed previous preventative health measures including screening tests, immunizations and labs.      HPI   CURRENT COMPLAINTS OR CONCERNS:    Her recent labs showed that she has prediabetes with a A1c of 5.8.  This is a new diagnosis today and she is here to discuss it further.  She denies any polyuria or polydipsia.  She does drink a lot of coffee with a lot of sugar on review.    PREVIOUS PREVENTATIVE HEALTH    Colonoscopy : No  Cologuard :  YES -- DATE 6/28/2024  Mammogram :  YES -- DATE 5/24/2025  Pap Test : YES -- DATE 5/2025 - Family planning  Hepatitis C Antibody : Yes 6/26/2024  HIV Screening : Yes 6/26/2024  Tdap : YES -- DATE 8/10/2023   Yearly Flu Shot :  YES    CURRENT FINDINGS  Healthy Diet : YES  Exercise :  NO  Depression or Anxiety Issues : NO  Alcohol Use : NO  Tobacco Use : NO  Drug Use : NO      Past Medical History    Patient Active Problem List    Diagnosis Date Noted    Prediabetes 06/17/2025    Mild intermittent asthma without complication ([facility]) 02/20/2024    Allergic conjunctivitis 07/10/2023    Primary hypertension 07/10/2023    Seasonal allergic rhinitis 07/10/2023    Dyspnea on exertion 06/19/2024    Encounter for annual wellness exam in Medicare patient 06/19/2024    Allergic contact dermatitis 02/20/2024    Closed displaced fracture of distal phalanx of ring finger with routine healing 07/10/2023    Old inferior wall myocardial infarction 07/10/2023       Surgical History[1]     Current Outpatient Medications   Medication Instructions    albuterol 90 mcg/actuation inhaler 2 puffs, inhalation, Every 6 hours PRN    budesonide-formoterol (Symbicort) 160-4.5 mcg/actuation inhaler 2 puffs, inhalation, 2 times daily RT, Rinse mouth with water after use to reduce aftertaste and incidence of candidiasis. Do not swallow.    fluticasone (Flonase) 50 mcg/actuation nasal spray 1 spray, Each Nostril, Daily    levocetirizine  (XYZAL) 5 mg, oral, Daily    losartan (COZAAR) 50 mg, oral, Daily    montelukast (SINGULAIR) 10 mg, oral, Daily    olopatadine (Patanol) 0.1 % ophthalmic solution 1 drop, Both Eyes, 2 times daily    triamcinolone (Kenalog) 0.1 % cream Topical, 2 times daily, Use as needed for when rashes appear. Do not use for more than 2 weeks a time.        Immunization History   Administered Date(s) Administered    Flu vaccine, trivalent, preservative free, age 6 months and greater (Fluarix/Fluzone/Flulaval) 10/11/2024    Influenza, seasonal, injectable 10/28/2022    Pneumococcal polysaccharide vaccine, 23-valent, age 2 years and older (PNEUMOVAX 23) 06/29/2020    Tdap vaccine, age 7 year and older (BOOSTRIX, ADACEL) 06/29/2020, 08/10/2023        Social History  Social History     Socioeconomic History    Marital status: Single     Spouse name: Not on file    Number of children: Not on file    Years of education: Not on file    Highest education level: Not on file   Occupational History    Not on file   Tobacco Use    Smoking status: Never    Smokeless tobacco: Never   Vaping Use    Vaping status: Never Used   Substance and Sexual Activity    Alcohol use: Never    Drug use: Never    Sexual activity: Not on file   Other Topics Concern    Not on file   Social History Narrative    Not on file     Social Drivers of Health     Financial Resource Strain: Low Risk  (6/7/2023)    Received from [facility]    Overall Financial Resource Strain (CARDIA)     Difficulty of Paying Living Expenses: Not hard at all   Food Insecurity: No Food Insecurity (6/7/2023)    Received from [facility]    Hunger Vital Sign     Worried About Running Out of Food in the Last Year: Never true     Ran Out of Food in the Last Year: Never true   Transportation Needs: Unknown (6/7/2023)    Received from [facility]    PRAPARE - Transportation     Lack of Transportation (Medical): Not on file      "Lack of Transportation (Non-Medical): No   Physical Activity: Not on file   Stress: Not on file   Social Connections: Not on file   Intimate Partner Violence: Not on file   Housing Stability: Unknown (6/7/2023)    Received from [facility]    Housing Stability Vital Sign     Unable to Pay for Housing in the Last Year: Not on file     Number of Places Lived in the Last Year: Not on file     Unstable Housing in the Last Year: No        reports no history of alcohol use.    reports that she has never smoked. She has never used smokeless tobacco.    reports no history of drug use.           Allergies  Ace inhibitors and Amoxicillin      Physical Exam  Visit Vitals  /80   Pulse 80   Temp 36.3 °C (97.3 °F)   Resp 16   Ht 1.676 m (5' 6\")   Wt 113 kg (250 lb)   BMI 40.35 kg/m²   OB Status Having periods   Smoking Status Never   BSA 2.29 m²     Body mass index is 40.35 kg/m².    Physical Exam  Vitals and nursing note reviewed.   Constitutional:       General: She is not in acute distress.     Appearance: Normal appearance.   HENT:      Head: Normocephalic and atraumatic.      Right Ear: Tympanic membrane, ear canal and external ear normal.      Left Ear: Tympanic membrane, ear canal and external ear normal.      Nose: Nose normal.      Mouth/Throat:      Mouth: Mucous membranes are moist.      Pharynx: Oropharynx is clear.   Eyes:      Extraocular Movements: Extraocular movements intact.      Conjunctiva/sclera: Conjunctivae normal.      Pupils: Pupils are equal, round, and reactive to light.   Cardiovascular:      Rate and Rhythm: Normal rate and regular rhythm.      Pulses: Normal pulses.      Heart sounds: Normal heart sounds. No murmur heard.     No friction rub. No gallop.   Pulmonary:      Effort: Pulmonary effort is normal. No respiratory distress.      Breath sounds: Normal breath sounds.   Chest:   Breasts:     Right: Normal. No mass, nipple discharge or skin change.      Left: Normal. No " mass, nipple discharge or skin change.   Abdominal:      General: Abdomen is flat. Bowel sounds are normal. There is no distension.      Palpations: Abdomen is soft.      Tenderness: There is no abdominal tenderness.   Musculoskeletal:         General: Normal range of motion.      Cervical back: Normal range of motion and neck supple.   Lymphadenopathy:      Cervical: No cervical adenopathy.      Upper Body:      Right upper body: No axillary adenopathy.      Left upper body: No axillary adenopathy.   Skin:     General: Skin is warm and dry.      Findings: No lesion or rash.   Neurological:      General: No focal deficit present.      Mental Status: She is alert. Mental status is at baseline.   Psychiatric:         Mood and Affect: Mood normal.         Behavior: Behavior normal.         Thought Content: Thought content normal.         Judgment: Judgment normal.                 Assessment      Assessment & Plan  Routine general medical examination at health care facility  Recommend regular exercise, balanced diet, regular dental exams, and healthy habits.  Appropriate labs ordered or reviewed.  I recommend to eat plenty of plant foods (such as whole-grain products, fruits, and vegetables) and a moderate amount of lean and low-fat, animal-based food (meat and dairy products).  When shopping, choose lean meats, fish, and poultry. I recommend to increase aerobic exercise.  I recommend a yearly flu shot in the fall and I recommend a yearly wellness exam.        Orders:    1 Year Follow Up In [facility] - PCP - Wellness Exam    Prediabetes  Much education on prediabetes, cause, sequelae, diet, weight.  Answered all questions.  Explained insulin's role and usual function vs changes that lead to DM2.  Much advice on diet - 3 meals per day, carb counting, sweets.      Her A1c was only slightly elevated so metformin is not really needed at this point.    I reivewed the dangers of this disease especially if patient  is not controlling glucose readings well.  I reviewed the serious sequelae of poor control, including blindness, renal failure, amputations, CAD, CVA, neuropathy.  Patient understands.    Will f/u with me in 6 months for recheck and A1C repeat.               Anticipatory Guidance     Eat well: Eat a balanced diet that includes lots of fruits and vegetables, whole grains, nuts, seeds, and legumes. Limit processed foods, sugar, saturated fat, and salt. Aim to eat at least five servings of fruits and vegetables per day, and no more than 1 teaspoon of salt.    Exercise: Try to exercise at least 30 minutes most days of the week.    Sleep: Aim to get 7-9 hours of sleep each night. Establish a bedtime routine and create a sleep-friendly environment.    Stay hydrated: Drink water and limit sugary beverages.    Reduce sitting time: Be mindful of your screen time.    Keep company with good people:  Set limits and boundaries.  Be selective.    Manage stress: Take breaks from the news, talk with someone you trust.    Other tips: Avoid drugs, tobacco and vaping.  Maintain a healthy weight, get regular health checkups, and limit alcohol          No orders of the defined types were placed in this encounter.       No orders of the defined types were placed in this encounter.            [1]   Past Surgical History:  Procedure Laterality Date    OTHER SURGICAL HISTORY  2019    Hand surgery    OTHER SURGICAL HISTORY  2019     section    OTHER SURGICAL HISTORY  2019    Facial surgery

## 2025-06-17 NOTE — ASSESSMENT & PLAN NOTE
Much education on prediabetes, cause, sequelae, diet, weight.  Answered all questions.  Explained insulin's role and usual function vs changes that lead to DM2.  Much advice on diet - 3 meals per day, carb counting, sweets.      Her A1c was only slightly elevated so metformin is not really needed at this point.    I reivewed the dangers of this disease especially if patient is not controlling glucose readings well.  I reviewed the serious sequelae of poor control, including blindness, renal failure, amputations, CAD, CVA, neuropathy.  Patient understands.    Will f/u with me in 6 months for recheck and A1C repeat.

## 2025-06-23 ENCOUNTER — APPOINTMENT (OUTPATIENT)
Dept: PRIMARY CARE | Facility: CLINIC | Age: 50
End: 2025-06-23
Payer: COMMERCIAL

## 2025-11-04 ENCOUNTER — APPOINTMENT (OUTPATIENT)
Dept: PRIMARY CARE | Facility: CLINIC | Age: 50
End: 2025-11-04
Payer: COMMERCIAL

## 2025-12-16 ENCOUNTER — APPOINTMENT (OUTPATIENT)
Dept: PRIMARY CARE | Facility: CLINIC | Age: 50
End: 2025-12-16
Payer: COMMERCIAL

## (undated) DEVICE — GLOVE ORANGE PI 7 1/2   MSG9075

## (undated) DEVICE — PADDING CAST W4INXL4YD HIGHLY ABSRB THAN COT EZ APPL

## (undated) DEVICE — DRILL BIT

## (undated) DEVICE — TUBING, SUCTION, 1/4" X 10', STRAIGHT: Brand: MEDLINE

## (undated) DEVICE — HAND II: Brand: MEDLINE INDUSTRIES, INC.

## (undated) DEVICE — ELECTROSURGICAL PENCIL BUTTON SWITCH E-Z CLEAN COATED BLADE ELECTRODE 10 FT (3 M) CORD HOLSTER: Brand: MEGADYNE

## (undated) DEVICE — BANDAGE COBAN 2 IN COMPR FOAM 2INX5YD COFLX LF2

## (undated) DEVICE — 1010 S-DRAPE TOWEL DRAPE 10/BX: Brand: STERI-DRAPE™

## (undated) DEVICE — SYRINGE IRRIG 60ML SFT PLIABLE BLB EZ TO GRP 1 HND USE W/

## (undated) DEVICE — DRAPE C ARM W41XL125IN UNIV W CLP AND BND FOR FULL SZ C ARM

## (undated) DEVICE — SPONGE GZ W4XL4IN COT 12 PLY TYP VII WVN C FLD DSGN

## (undated) DEVICE — GLOVE SURG SZ 75 L12IN FNGR THK13MIL BRN LTX SYN POLYMER W

## (undated) DEVICE — ELECTRODE PT RET AD L9FT HI MOIST COND ADH HYDRGEL CORDED

## (undated) DEVICE — GOWN,AURORA,NONREINFORCED,LARGE: Brand: MEDLINE

## (undated) DEVICE — PADDING UNDERCAST W4INXL12FT RAYON POLY SYN NONADHESIVE

## (undated) DEVICE — Z DISCONTINUED NO SUB IDED DRAIN PENROSE L12IN 0.25IN USED TO PROMOTE DRNAGE IN OPN

## (undated) DEVICE — CURITY NON-ADHERENT STRIPS: Brand: CURITY

## (undated) DEVICE — BANDAGE,GAUZE,BULKEE II,4.5"X4.1YD,STRL: Brand: MEDLINE

## (undated) DEVICE — BANDAGE COMPR W4INXL5YD BGE HI E W/ REM CLP SURE-WRAP

## (undated) DEVICE — ZIMMER® STERILE DISPOSABLE TOURNIQUET CUFF, DUAL PORT, SINGLE BLADDER, 18 IN. (46 CM)

## (undated) DEVICE — CHLORAPREP 26ML ORANGE

## (undated) DEVICE — PAD,ABDOMINAL,8"X10",ST,LF: Brand: MEDLINE